# Patient Record
Sex: FEMALE | Race: WHITE | NOT HISPANIC OR LATINO | Employment: FULL TIME | URBAN - METROPOLITAN AREA
[De-identification: names, ages, dates, MRNs, and addresses within clinical notes are randomized per-mention and may not be internally consistent; named-entity substitution may affect disease eponyms.]

---

## 2018-05-23 ENCOUNTER — HOSPITAL ENCOUNTER (OUTPATIENT)
Dept: GENERAL RADIOLOGY | Facility: HOSPITAL | Age: 33
Discharge: HOME | End: 2018-05-23

## 2020-01-29 ENCOUNTER — LAB (OUTPATIENT)
Dept: FAMILY MEDICINE CLINIC | Facility: CLINIC | Age: 35
End: 2020-01-29

## 2020-01-29 ENCOUNTER — OFFICE VISIT (OUTPATIENT)
Dept: FAMILY MEDICINE CLINIC | Facility: CLINIC | Age: 35
End: 2020-01-29

## 2020-01-29 VITALS
TEMPERATURE: 98.1 F | WEIGHT: 140 LBS | SYSTOLIC BLOOD PRESSURE: 112 MMHG | HEART RATE: 82 BPM | DIASTOLIC BLOOD PRESSURE: 78 MMHG | OXYGEN SATURATION: 99 %

## 2020-01-29 DIAGNOSIS — R07.9 CHEST PAIN, UNSPECIFIED TYPE: Primary | ICD-10-CM

## 2020-01-29 DIAGNOSIS — R07.9 CHEST PAIN, UNSPECIFIED TYPE: ICD-10-CM

## 2020-01-29 DIAGNOSIS — R00.2 PALPITATIONS: ICD-10-CM

## 2020-01-29 DIAGNOSIS — R06.02 SHORTNESS OF BREATH: ICD-10-CM

## 2020-01-29 LAB
ALBUMIN SERPL-MCNC: 4.2 G/DL (ref 3.5–5.2)
ALBUMIN/GLOB SERPL: 1.6 G/DL
ALP SERPL-CCNC: 60 U/L (ref 39–117)
ALT SERPL W P-5'-P-CCNC: 6 U/L (ref 1–33)
ANION GAP SERPL CALCULATED.3IONS-SCNC: 12.2 MMOL/L (ref 5–15)
AST SERPL-CCNC: 11 U/L (ref 1–32)
BASOPHILS # BLD AUTO: 0.05 10*3/MM3 (ref 0–0.2)
BASOPHILS NFR BLD AUTO: 0.6 % (ref 0–1.5)
BILIRUB SERPL-MCNC: <0.2 MG/DL (ref 0.2–1.2)
BUN BLD-MCNC: 12 MG/DL (ref 6–20)
BUN/CREAT SERPL: 19.7 (ref 7–25)
CALCIUM SPEC-SCNC: 9.1 MG/DL (ref 8.6–10.5)
CHLORIDE SERPL-SCNC: 102 MMOL/L (ref 98–107)
CO2 SERPL-SCNC: 24.8 MMOL/L (ref 22–29)
CREAT BLD-MCNC: 0.61 MG/DL (ref 0.57–1)
DEPRECATED RDW RBC AUTO: 40.6 FL (ref 37–54)
EOSINOPHIL # BLD AUTO: 0.22 10*3/MM3 (ref 0–0.4)
EOSINOPHIL NFR BLD AUTO: 2.5 % (ref 0.3–6.2)
ERYTHROCYTE [DISTWIDTH] IN BLOOD BY AUTOMATED COUNT: 12 % (ref 12.3–15.4)
GFR SERPL CREATININE-BSD FRML MDRD: 111 ML/MIN/1.73
GLOBULIN UR ELPH-MCNC: 2.6 GM/DL
GLUCOSE BLD-MCNC: 88 MG/DL (ref 65–99)
HCT VFR BLD AUTO: 40.4 % (ref 34–46.6)
HGB BLD-MCNC: 13.6 G/DL (ref 12–15.9)
IMM GRANULOCYTES # BLD AUTO: 0.05 10*3/MM3 (ref 0–0.05)
IMM GRANULOCYTES NFR BLD AUTO: 0.6 % (ref 0–0.5)
LYMPHOCYTES # BLD AUTO: 2.1 10*3/MM3 (ref 0.7–3.1)
LYMPHOCYTES NFR BLD AUTO: 23.6 % (ref 19.6–45.3)
MCH RBC QN AUTO: 30.9 PG (ref 26.6–33)
MCHC RBC AUTO-ENTMCNC: 33.7 G/DL (ref 31.5–35.7)
MCV RBC AUTO: 91.8 FL (ref 79–97)
MONOCYTES # BLD AUTO: 0.71 10*3/MM3 (ref 0.1–0.9)
MONOCYTES NFR BLD AUTO: 8 % (ref 5–12)
NEUTROPHILS # BLD AUTO: 5.75 10*3/MM3 (ref 1.7–7)
NEUTROPHILS NFR BLD AUTO: 64.7 % (ref 42.7–76)
NRBC BLD AUTO-RTO: 0 /100 WBC (ref 0–0.2)
PLATELET # BLD AUTO: 198 10*3/MM3 (ref 140–450)
PMV BLD AUTO: 10.7 FL (ref 6–12)
POTASSIUM BLD-SCNC: 4.2 MMOL/L (ref 3.5–5.2)
PROT SERPL-MCNC: 6.8 G/DL (ref 6–8.5)
RBC # BLD AUTO: 4.4 10*6/MM3 (ref 3.77–5.28)
SODIUM BLD-SCNC: 139 MMOL/L (ref 136–145)
TSH SERPL DL<=0.05 MIU/L-ACNC: 1.16 UIU/ML (ref 0.27–4.2)
WBC NRBC COR # BLD: 8.88 10*3/MM3 (ref 3.4–10.8)

## 2020-01-29 PROCEDURE — 99203 OFFICE O/P NEW LOW 30 MIN: CPT | Performed by: NURSE PRACTITIONER

## 2020-01-29 PROCEDURE — 85025 COMPLETE CBC W/AUTO DIFF WBC: CPT | Performed by: NURSE PRACTITIONER

## 2020-01-29 PROCEDURE — 80053 COMPREHEN METABOLIC PANEL: CPT | Performed by: NURSE PRACTITIONER

## 2020-01-29 PROCEDURE — 36415 COLL VENOUS BLD VENIPUNCTURE: CPT | Performed by: NURSE PRACTITIONER

## 2020-01-29 PROCEDURE — 84443 ASSAY THYROID STIM HORMONE: CPT | Performed by: NURSE PRACTITIONER

## 2020-01-29 NOTE — PROGRESS NOTES
Subjective   Jeannine Delcid is a 34 y.o. female.       HPI   Pt. new to our office today. Says she hasn't seen a PCP in many years.    Has c/o chest pain/palpitations and SOA.    Symptoms started in Oct. 2019  Has a constant chest tightness/SOA; has random palpitations multiple times throughout the day.  Happening everyday at this point; no symptoms currently.      Doesn't feel it is anxiety related.    Better at rest; happens more now when she is active.   Has more fatigue; feels tired all the time. Occasionally will have back pain with it but not always.  No N/V.  No sweating.   Had trouble with gallbladder in the past but not currently.   No bowel or bladder issues.        The following portions of the patient's history were reviewed and updated as appropriate: allergies, current medications, past family history, past medical history, past social history, past surgical history and problem list.    Review of Systems   Constitutional: Negative for activity change, appetite change, chills, diaphoresis, fatigue, fever, unexpected weight gain and unexpected weight loss.   HENT: Negative for congestion, ear pain, nosebleeds, postnasal drip, rhinorrhea, sinus pressure, sore throat, swollen glands, trouble swallowing and voice change.    Eyes: Negative for blurred vision, double vision, photophobia, pain, discharge, redness, itching and visual disturbance.   Respiratory: Positive for shortness of breath. Negative for cough and wheezing.    Cardiovascular: Positive for chest pain and palpitations. Negative for leg swelling.   Gastrointestinal: Negative for abdominal distention, abdominal pain, blood in stool, constipation, diarrhea, nausea, vomiting and indigestion.   Endocrine: Negative for cold intolerance, heat intolerance, polydipsia, polyphagia and polyuria.   Genitourinary: Negative for decreased urine volume, dysuria, flank pain, frequency, hematuria, pelvic pain and urgency.   Musculoskeletal: Positive for back  pain. Negative for arthralgias, gait problem, joint swelling and myalgias.   Skin: Negative for color change, rash and skin lesions.   Allergic/Immunologic: Negative for environmental allergies and food allergies.   Neurological: Negative for dizziness, syncope, weakness, light-headedness, numbness, headache and confusion.   Hematological: Negative for adenopathy.   Psychiatric/Behavioral: Negative for depressed mood. The patient is not nervous/anxious.        Objective   Physical Exam   Constitutional: She is oriented to person, place, and time. She appears well-developed and well-nourished. No distress.   HENT:   Head: Normocephalic and atraumatic.   Right Ear: Hearing, tympanic membrane, external ear and ear canal normal.   Left Ear: Hearing, tympanic membrane, external ear and ear canal normal.   Nose: Nose normal. Right sinus exhibits no maxillary sinus tenderness and no frontal sinus tenderness. Left sinus exhibits no maxillary sinus tenderness and no frontal sinus tenderness.   Mouth/Throat: Uvula is midline, oropharynx is clear and moist and mucous membranes are normal.   Eyes: Pupils are equal, round, and reactive to light. Conjunctivae and EOM are normal. Right eye exhibits no discharge. Left eye exhibits no discharge.   Neck: Normal range of motion. Neck supple. No JVD present. No thyromegaly present.   Cardiovascular: Normal rate, regular rhythm, normal heart sounds and intact distal pulses. Exam reveals no gallop and no friction rub.   No murmur heard.  Pulmonary/Chest: Effort normal and breath sounds normal. No respiratory distress. She has no wheezes. She exhibits no tenderness.   Abdominal: Soft. Bowel sounds are normal. She exhibits no distension and no mass. There is no tenderness. There is no rebound and no guarding.   Musculoskeletal: Normal range of motion. She exhibits no edema.   Lymphadenopathy:     She has no cervical adenopathy.   Neurological: She is alert and oriented to person, place,  and time. No cranial nerve deficit.   Skin: Skin is warm and dry. Capillary refill takes less than 2 seconds. No rash noted. She is not diaphoretic. No erythema.   Psychiatric: She has a normal mood and affect. Her speech is normal and behavior is normal. Judgment and thought content normal. Her mood appears not anxious. Cognition and memory are normal. She does not exhibit a depressed mood.   Vitals reviewed.        Assessment/Plan   Jeannine was seen today for chest pain and shortness of breath.    Diagnoses and all orders for this visit:    Chest pain, unspecified type  Comments:  Holter monitor; consider cardiology referral   CXR  Lab work today  Reviewd warning S/S and when to go to ER  Orders:  -     CBC Auto Differential  -     Comprehensive Metabolic Panel  -     TSH; Future  -     XR Chest PA & Lateral; Future  -     Holter monitor - 24 hour; Future    Palpitations  Comments:  Holter monitor; considder cardiology referral   CXR  Lab work today  Reviewd warning S/S and when to go to ER  Orders:  -     CBC Auto Differential  -     Comprehensive Metabolic Panel  -     TSH; Future  -     XR Chest PA & Lateral; Future  -     Holter monitor - 24 hour; Future    Shortness of breath  Comments:  CXR and labwork today  Orders:  -     CBC Auto Differential  -     Comprehensive Metabolic Panel  -     TSH; Future  -     XR Chest PA & Lateral; Future

## 2020-02-01 ENCOUNTER — HOSPITAL ENCOUNTER (OUTPATIENT)
Dept: GENERAL RADIOLOGY | Facility: HOSPITAL | Age: 35
Discharge: HOME | End: 2020-02-01
Admitting: NURSE PRACTITIONER

## 2020-02-01 DIAGNOSIS — R00.2 PALPITATIONS: ICD-10-CM

## 2020-02-01 DIAGNOSIS — R07.9 CHEST PAIN, UNSPECIFIED TYPE: ICD-10-CM

## 2020-02-01 DIAGNOSIS — R06.02 SHORTNESS OF BREATH: ICD-10-CM

## 2020-02-01 PROCEDURE — 71046 X-RAY EXAM CHEST 2 VIEWS: CPT

## 2020-02-04 ENCOUNTER — APPOINTMENT (OUTPATIENT)
Dept: RESPIRATORY THERAPY | Facility: HOSPITAL | Age: 35
End: 2020-02-04

## 2020-02-06 ENCOUNTER — APPOINTMENT (OUTPATIENT)
Dept: RESPIRATORY THERAPY | Facility: HOSPITAL | Age: 35
End: 2020-02-06

## 2020-02-07 NOTE — PROGRESS NOTES
Patient notified. She wanted to let you know that she canceled the heart monitor because it was too expensive

## 2020-02-07 NOTE — PROGRESS NOTES
She does not want one as of now. She says she is pretty sure it is just anxiety but will call if she changes her mind.

## 2020-07-28 ENCOUNTER — OFFICE VISIT (OUTPATIENT)
Dept: FAMILY MEDICINE CLINIC | Facility: CLINIC | Age: 35
End: 2020-07-28

## 2020-07-28 VITALS
DIASTOLIC BLOOD PRESSURE: 69 MMHG | SYSTOLIC BLOOD PRESSURE: 102 MMHG | OXYGEN SATURATION: 98 % | BODY MASS INDEX: 21.37 KG/M2 | HEIGHT: 68 IN | HEART RATE: 90 BPM | WEIGHT: 141 LBS | TEMPERATURE: 97.5 F

## 2020-07-28 DIAGNOSIS — R59.1 LYMPHADENOPATHY: ICD-10-CM

## 2020-07-28 DIAGNOSIS — J02.9 ACUTE PHARYNGITIS, UNSPECIFIED ETIOLOGY: Primary | ICD-10-CM

## 2020-07-28 PROCEDURE — 99213 OFFICE O/P EST LOW 20 MIN: CPT | Performed by: NURSE PRACTITIONER

## 2020-07-28 RX ORDER — AZITHROMYCIN 250 MG/1
TABLET, FILM COATED ORAL
Qty: 6 TABLET | Refills: 0 | Status: SHIPPED | OUTPATIENT
Start: 2020-07-28 | End: 2022-08-26

## 2020-07-28 NOTE — PATIENT INSTRUCTIONS
Sore Throat  A sore throat is pain, burning, irritation, or scratchiness in the throat. When you have a sore throat, you may feel pain or tenderness in your throat when you swallow or talk.  Many things can cause a sore throat, including:  · An infection.  · Seasonal allergies.  · Dryness in the air.  · Irritants, such as smoke or pollution.  · Radiation treatment to the area.  · Gastroesophageal reflux disease (GERD).  · A tumor.  A sore throat is often the first sign of another sickness. It may happen with other symptoms, such as coughing, sneezing, fever, and swollen neck glands. Most sore throats go away without medical treatment.  Follow these instructions at home:         · Take over-the-counter medicines only as told by your health care provider.  ? If your child has a sore throat, do not give your child aspirin because of the association with Reye syndrome.  · Drink enough fluids to keep your urine pale yellow.  · Rest as needed.  · To help with pain, try:  ? Sipping warm liquids, such as broth, herbal tea, or warm water.  ? Eating or drinking cold or frozen liquids, such as frozen ice pops.  ? Gargling with a salt-water mixture 3-4 times a day or as needed. To make a salt-water mixture, completely dissolve ½-1 tsp (3-6 g) of salt in 1 cup (237 mL) of warm water.  ? Sucking on hard candy or throat lozenges.  ? Putting a cool-mist humidifier in your bedroom at night to moisten the air.  ? Sitting in the bathroom with the door closed for 5-10 minutes while you run hot water in the shower.  · Do not use any products that contain nicotine or tobacco, such as cigarettes, e-cigarettes, and chewing tobacco. If you need help quitting, ask your health care provider.  · Wash your hands well and often with soap and water. If soap and water are not available, use hand .  Contact a health care provider if:  · You have a fever for more than 2-3 days.  · You have symptoms that last (are persistent) for more than  2-3 days.  · Your throat does not get better within 7 days.  · You have a fever and your symptoms suddenly get worse.  · Your child who is 3 months to 3 years old has a temperature of 102.2°F (39°C) or higher.  Get help right away if:  · You have difficulty breathing.  · You cannot swallow fluids, soft foods, or your saliva.  · You have increased swelling in your throat or neck.  · You have persistent nausea and vomiting.  Summary  · A sore throat is pain, burning, irritation, or scratchiness in the throat. Many things can cause a sore throat.  · Take over-the-counter medicines only as told by your health care provider. Do not give your child aspirin.  · Drink plenty of fluids, and rest as needed.  · Contact a health care provider if your symptoms worsen or your sore throat does not get better within 7 days.  This information is not intended to replace advice given to you by your health care provider. Make sure you discuss any questions you have with your health care provider.  Document Released: 01/25/2006 Document Revised: 05/20/2019 Document Reviewed: 05/20/2019  [employer] Patient Education © 2020 [employer]

## 2020-07-28 NOTE — PROGRESS NOTES
Subjective   Jeannine Delcid is a 34 y.o. female.       HPI   Pt. is here today with concern of sore throat and possible swollen lymph nodes X 3 days.  She felt the lymph node on the left side of her neck that has been tender.  She denies any congestion; ear pain; cough or fevers.  She denies any changes to appetite; N/V/D.  She does smoke.    She has gotten relief with ibuprofen otc.    Denies any ill contacts.       The following portions of the patient's history were reviewed and updated as appropriate: allergies, current medications, past family history, past medical history, past social history, past surgical history and problem list.    Review of Systems   Constitutional: Negative for activity change, appetite change, chills, diaphoresis, fatigue, fever, unexpected weight gain and unexpected weight loss.   HENT: Positive for sore throat and swollen glands. Negative for congestion, ear discharge, ear pain, mouth sores, nosebleeds, postnasal drip, rhinorrhea, sinus pressure, sneezing and trouble swallowing.    Eyes: Negative for blurred vision, double vision and visual disturbance.   Respiratory: Negative for cough, chest tightness, shortness of breath and wheezing.    Cardiovascular: Negative for chest pain, palpitations and leg swelling.   Gastrointestinal: Negative for abdominal pain, diarrhea, nausea, vomiting, GERD and indigestion.   Genitourinary: Negative for dysuria, flank pain, frequency, hematuria and urgency.   Musculoskeletal: Negative for arthralgias, back pain, neck pain and neck stiffness.   Skin: Negative for rash.   Neurological: Positive for headache. Negative for dizziness, weakness, light-headedness, numbness and confusion.   Hematological: Positive for adenopathy (left neck).   Psychiatric/Behavioral: Negative for depressed mood. The patient is not nervous/anxious.        Objective   Physical Exam   Constitutional: She is oriented to person, place, and time. She appears well-developed and  well-nourished. No distress.   HENT:   Head: Normocephalic and atraumatic.   Right Ear: Hearing, tympanic membrane, external ear and ear canal normal.   Left Ear: Hearing, tympanic membrane, external ear and ear canal normal.   Nose: Nose normal. Right sinus exhibits no maxillary sinus tenderness and no frontal sinus tenderness. Left sinus exhibits no maxillary sinus tenderness and no frontal sinus tenderness.   Mouth/Throat: Uvula is midline and mucous membranes are normal. Posterior oropharyngeal erythema present. No oropharyngeal exudate, posterior oropharyngeal edema or tonsillar abscesses.   Eyes: Pupils are equal, round, and reactive to light. Conjunctivae are normal.   Neck: Normal range of motion. Neck supple. No JVD present. No thyromegaly present.   Cardiovascular: Normal rate, regular rhythm, normal heart sounds and intact distal pulses.   No murmur heard.  Pulmonary/Chest: Effort normal and breath sounds normal. No respiratory distress. She has no wheezes. She exhibits no tenderness.   Abdominal: Soft. Bowel sounds are normal. She exhibits no distension. There is no tenderness.   Musculoskeletal: She exhibits no edema.   Lymphadenopathy:        Head (right side): No submental, no submandibular, no tonsillar, no preauricular, no posterior auricular and no occipital adenopathy present.        Head (left side): No submental, no submandibular, no tonsillar, no preauricular, no posterior auricular and no occipital adenopathy present.     She has cervical adenopathy.        Right cervical: No superficial cervical, no deep cervical and no posterior cervical adenopathy present.       Left cervical: Superficial cervical adenopathy present. No deep cervical and no posterior cervical adenopathy present.     She has no axillary adenopathy.   Pea-sized left superficial cervical lymph node; mild tenderness with palpation.    Neurological: She is alert and oriented to person, place, and time.   Skin: Skin is warm and  dry. Capillary refill takes less than 2 seconds. No rash noted. No erythema.   Psychiatric: She has a normal mood and affect.   Vitals reviewed.        Assessment/Plan   Jeannine was seen today for pain.    Diagnoses and all orders for this visit:    Acute pharyngitis, unspecified etiology  Comments:  Given Zpak.   Warm salt water gargles several times daily; throat spray or lozenges as needed.   Tylenol or Ibuprofen as needed.   Orders:  -     azithromycin (Zithromax Z-Nahun) 250 MG tablet; Take 2 tablets the first day, then 1 tablet daily for 4 days.    Lymphadenopathy  Comments:  Warm compresses several times daily.   Tylenol or Ibuprofen as needed.    Call for worsening symptoms or if not improving.   Orders:  -     azithromycin (Zithromax Z-Nahun) 250 MG tablet; Take 2 tablets the first day, then 1 tablet daily for 4 days.

## 2020-10-08 ENCOUNTER — LAB (OUTPATIENT)
Dept: LAB | Facility: HOSPITAL | Age: 35
End: 2020-10-08

## 2020-10-08 ENCOUNTER — TRANSCRIBE ORDERS (OUTPATIENT)
Dept: ADMINISTRATIVE | Facility: HOSPITAL | Age: 35
End: 2020-10-08

## 2020-10-08 DIAGNOSIS — Z01.818 PRE-OPERATIVE CLEARANCE: ICD-10-CM

## 2020-10-08 DIAGNOSIS — Z01.818 PRE-OPERATIVE CLEARANCE: Primary | ICD-10-CM

## 2020-10-08 LAB
ABO GROUP BLD: NORMAL
BASOPHILS # BLD AUTO: 0.04 10*3/MM3 (ref 0–0.2)
BASOPHILS NFR BLD AUTO: 0.6 % (ref 0–1.5)
BLD GP AB SCN SERPL QL: NEGATIVE
DEPRECATED RDW RBC AUTO: 40.1 FL (ref 37–54)
EOSINOPHIL # BLD AUTO: 0.18 10*3/MM3 (ref 0–0.4)
EOSINOPHIL NFR BLD AUTO: 2.6 % (ref 0.3–6.2)
ERYTHROCYTE [DISTWIDTH] IN BLOOD BY AUTOMATED COUNT: 11.9 % (ref 12.3–15.4)
HCG INTACT+B SERPL-ACNC: <0.5 MIU/ML
HCT VFR BLD AUTO: 41.4 % (ref 34–46.6)
HGB BLD-MCNC: 13.7 G/DL (ref 12–15.9)
IMM GRANULOCYTES # BLD AUTO: 0.03 10*3/MM3 (ref 0–0.05)
IMM GRANULOCYTES NFR BLD AUTO: 0.4 % (ref 0–0.5)
LYMPHOCYTES # BLD AUTO: 1.98 10*3/MM3 (ref 0.7–3.1)
LYMPHOCYTES NFR BLD AUTO: 28.1 % (ref 19.6–45.3)
MCH RBC QN AUTO: 30.5 PG (ref 26.6–33)
MCHC RBC AUTO-ENTMCNC: 33.1 G/DL (ref 31.5–35.7)
MCV RBC AUTO: 92.2 FL (ref 79–97)
MONOCYTES # BLD AUTO: 0.56 10*3/MM3 (ref 0.1–0.9)
MONOCYTES NFR BLD AUTO: 7.9 % (ref 5–12)
NEUTROPHILS NFR BLD AUTO: 4.26 10*3/MM3 (ref 1.7–7)
NEUTROPHILS NFR BLD AUTO: 60.4 % (ref 42.7–76)
NRBC BLD AUTO-RTO: 0 /100 WBC (ref 0–0.2)
PLATELET # BLD AUTO: 191 10*3/MM3 (ref 140–450)
PMV BLD AUTO: 11 FL (ref 6–12)
RBC # BLD AUTO: 4.49 10*6/MM3 (ref 3.77–5.28)
RH BLD: POSITIVE
T&S EXPIRATION DATE: NORMAL
WBC # BLD AUTO: 7.05 10*3/MM3 (ref 3.4–10.8)

## 2020-10-08 PROCEDURE — 85025 COMPLETE CBC W/AUTO DIFF WBC: CPT

## 2020-10-08 PROCEDURE — 86850 RBC ANTIBODY SCREEN: CPT | Performed by: OBSTETRICS & GYNECOLOGY

## 2020-10-08 PROCEDURE — 84702 CHORIONIC GONADOTROPIN TEST: CPT

## 2020-10-08 PROCEDURE — 36415 COLL VENOUS BLD VENIPUNCTURE: CPT

## 2020-10-08 PROCEDURE — 86901 BLOOD TYPING SEROLOGIC RH(D): CPT

## 2020-10-08 PROCEDURE — 86900 BLOOD TYPING SEROLOGIC ABO: CPT | Performed by: OBSTETRICS & GYNECOLOGY

## 2020-10-08 PROCEDURE — 86901 BLOOD TYPING SEROLOGIC RH(D): CPT | Performed by: OBSTETRICS & GYNECOLOGY

## 2020-10-08 PROCEDURE — 86900 BLOOD TYPING SEROLOGIC ABO: CPT

## 2021-04-28 ENCOUNTER — OFFICE VISIT (OUTPATIENT)
Dept: FAMILY MEDICINE CLINIC | Facility: CLINIC | Age: 36
End: 2021-04-28

## 2021-04-28 ENCOUNTER — LAB (OUTPATIENT)
Dept: FAMILY MEDICINE CLINIC | Facility: CLINIC | Age: 36
End: 2021-04-28

## 2021-04-28 VITALS
SYSTOLIC BLOOD PRESSURE: 114 MMHG | TEMPERATURE: 97.5 F | HEART RATE: 88 BPM | BODY MASS INDEX: 21.37 KG/M2 | WEIGHT: 141 LBS | OXYGEN SATURATION: 99 % | DIASTOLIC BLOOD PRESSURE: 78 MMHG | HEIGHT: 68 IN

## 2021-04-28 DIAGNOSIS — R19.7 DIARRHEA, UNSPECIFIED TYPE: Primary | ICD-10-CM

## 2021-04-28 DIAGNOSIS — R19.7 DIARRHEA, UNSPECIFIED TYPE: ICD-10-CM

## 2021-04-28 LAB
ALBUMIN SERPL-MCNC: 4.4 G/DL (ref 3.5–5.2)
ALBUMIN/GLOB SERPL: 2 G/DL
ALP SERPL-CCNC: 74 U/L (ref 39–117)
ALT SERPL W P-5'-P-CCNC: 9 U/L (ref 1–33)
ANION GAP SERPL CALCULATED.3IONS-SCNC: 8.6 MMOL/L (ref 5–15)
AST SERPL-CCNC: 14 U/L (ref 1–32)
BASOPHILS # BLD AUTO: 0.05 10*3/MM3 (ref 0–0.2)
BASOPHILS NFR BLD AUTO: 0.7 % (ref 0–1.5)
BILIRUB SERPL-MCNC: 0.2 MG/DL (ref 0–1.2)
BUN SERPL-MCNC: 9 MG/DL (ref 6–20)
BUN/CREAT SERPL: 14.8 (ref 7–25)
CALCIUM SPEC-SCNC: 9.1 MG/DL (ref 8.6–10.5)
CHLORIDE SERPL-SCNC: 104 MMOL/L (ref 98–107)
CO2 SERPL-SCNC: 27.4 MMOL/L (ref 22–29)
CREAT SERPL-MCNC: 0.61 MG/DL (ref 0.57–1)
DEPRECATED RDW RBC AUTO: 40.4 FL (ref 37–54)
EOSINOPHIL # BLD AUTO: 0.21 10*3/MM3 (ref 0–0.4)
EOSINOPHIL NFR BLD AUTO: 3 % (ref 0.3–6.2)
ERYTHROCYTE [DISTWIDTH] IN BLOOD BY AUTOMATED COUNT: 12 % (ref 12.3–15.4)
GFR SERPL CREATININE-BSD FRML MDRD: 110 ML/MIN/1.73
GLOBULIN UR ELPH-MCNC: 2.2 GM/DL
GLUCOSE SERPL-MCNC: 86 MG/DL (ref 65–99)
HCT VFR BLD AUTO: 41.4 % (ref 34–46.6)
HGB BLD-MCNC: 13.8 G/DL (ref 12–15.9)
IMM GRANULOCYTES # BLD AUTO: 0.02 10*3/MM3 (ref 0–0.05)
IMM GRANULOCYTES NFR BLD AUTO: 0.3 % (ref 0–0.5)
LYMPHOCYTES # BLD AUTO: 1.96 10*3/MM3 (ref 0.7–3.1)
LYMPHOCYTES NFR BLD AUTO: 28.3 % (ref 19.6–45.3)
MCH RBC QN AUTO: 30.5 PG (ref 26.6–33)
MCHC RBC AUTO-ENTMCNC: 33.3 G/DL (ref 31.5–35.7)
MCV RBC AUTO: 91.4 FL (ref 79–97)
MONOCYTES # BLD AUTO: 0.61 10*3/MM3 (ref 0.1–0.9)
MONOCYTES NFR BLD AUTO: 8.8 % (ref 5–12)
NEUTROPHILS NFR BLD AUTO: 4.07 10*3/MM3 (ref 1.7–7)
NEUTROPHILS NFR BLD AUTO: 58.9 % (ref 42.7–76)
NRBC BLD AUTO-RTO: 0 /100 WBC (ref 0–0.2)
PLATELET # BLD AUTO: 206 10*3/MM3 (ref 140–450)
PMV BLD AUTO: 10.8 FL (ref 6–12)
POTASSIUM SERPL-SCNC: 4.3 MMOL/L (ref 3.5–5.2)
PROT SERPL-MCNC: 6.6 G/DL (ref 6–8.5)
RBC # BLD AUTO: 4.53 10*6/MM3 (ref 3.77–5.28)
SODIUM SERPL-SCNC: 140 MMOL/L (ref 136–145)
WBC # BLD AUTO: 6.92 10*3/MM3 (ref 3.4–10.8)

## 2021-04-28 PROCEDURE — 36415 COLL VENOUS BLD VENIPUNCTURE: CPT

## 2021-04-28 PROCEDURE — 85025 COMPLETE CBC W/AUTO DIFF WBC: CPT | Performed by: NURSE PRACTITIONER

## 2021-04-28 PROCEDURE — 80053 COMPREHEN METABOLIC PANEL: CPT | Performed by: NURSE PRACTITIONER

## 2021-04-28 PROCEDURE — 99213 OFFICE O/P EST LOW 20 MIN: CPT | Performed by: NURSE PRACTITIONER

## 2021-04-28 RX ORDER — OMEPRAZOLE 40 MG/1
40 CAPSULE, DELAYED RELEASE ORAL DAILY
Qty: 30 CAPSULE | Refills: 0 | Status: SHIPPED | OUTPATIENT
Start: 2021-04-28

## 2021-04-28 RX ORDER — DICYCLOMINE HYDROCHLORIDE 10 MG/1
10 CAPSULE ORAL
Qty: 30 CAPSULE | Refills: 0 | Status: SHIPPED | OUTPATIENT
Start: 2021-04-28

## 2021-04-28 RX ORDER — PANTOPRAZOLE SODIUM 40 MG/1
40 TABLET, DELAYED RELEASE ORAL DAILY
Qty: 30 TABLET | Refills: 0 | Status: CANCELLED | OUTPATIENT
Start: 2021-04-28

## 2021-04-28 NOTE — PATIENT INSTRUCTIONS
Start omeprazole daily  Take bentyl as needed for abdominal cramping  immodium as needed  Referral to GI  Complete blood work

## 2021-04-28 NOTE — PROGRESS NOTES
"Subjective   Jeannine Delcid is a 35 y.o. female.     Pt is here today with c/o diarrhea, abdominal pain, vomiting.  She states that the diarrhea is chronic and has been occurring for the last year.  The diarrhea has been worsening over the last month.  She is having 5-7 episodes a night, only occasionally during the day.  Last night she was up all night with the diarrhea.  She gets lower abdominal pain with it.  She doesn't know any triggers.  She has been taking pepto bisthmuth which helps some.  She has some bloating.  Probiotics have helped with the bloating.  Has a hemorrhoid and occasional has bright red blood.   She reports acid reflux.  She smokes  Does not drink alcohol           The following portions of the patient's history were reviewed and updated as appropriate: allergies, current medications, past family history, past medical history, past social history, past surgical history and problem list.    Review of Systems   Constitutional: Negative for chills, fatigue and fever.   Respiratory: Negative for chest tightness and shortness of breath.    Cardiovascular: Negative for chest pain and palpitations.   Gastrointestinal: Positive for abdominal pain, diarrhea, nausea and vomiting.   Neurological: Negative for dizziness and headache.       Objective   /78 (BP Location: Right arm, Patient Position: Sitting, Cuff Size: Adult)   Pulse 88   Temp 97.5 °F (36.4 °C) (Temporal)   Ht 172.7 cm (68\")   Wt 64 kg (141 lb)   SpO2 99%   BMI 21.44 kg/m²   Physical Exam  Constitutional:       Appearance: Normal appearance. She is not ill-appearing.   HENT:      Head: Normocephalic and atraumatic.   Cardiovascular:      Rate and Rhythm: Normal rate and regular rhythm.      Heart sounds: No murmur heard.     Pulmonary:      Effort: Pulmonary effort is normal. No respiratory distress.      Breath sounds: Normal breath sounds.   Abdominal:      General: Abdomen is flat.      Palpations: Abdomen is soft.      " Tenderness: There is abdominal tenderness (lower abdomen).   Skin:     General: Skin is warm and dry.   Neurological:      General: No focal deficit present.      Mental Status: She is alert and oriented to person, place, and time.   Psychiatric:         Mood and Affect: Mood normal.         Behavior: Behavior normal.         Thought Content: Thought content normal.         Judgment: Judgment normal.           Assessment/Plan     Diagnoses and all orders for this visit:    1. Diarrhea, unspecified type (Primary)  Comments:  possibly IBS  check labs- possibly gallbladder  start omeprazole for gERD  bentyl for abd pain- ibs  referral to GI    Orders:  -     Ambulatory Referral to Gastroenterology  -     Comprehensive Metabolic Panel; Future  -     CBC & Differential  -     dicyclomine (Bentyl) 10 MG capsule; Take 1 capsule by mouth 4 (Four) Times a Day Before Meals & at Bedtime As Needed (abdominal cramping).  Dispense: 30 capsule; Refill: 0  -     omeprazole (priLOSEC) 40 MG capsule; Take 1 capsule by mouth Daily.  Dispense: 30 capsule; Refill: 0    Other orders  -     Cancel: pantoprazole (Protonix) 40 MG EC tablet; Take 1 tablet by mouth Daily.  Dispense: 30 tablet; Refill: 0

## 2022-08-25 NOTE — PROGRESS NOTES
Subjective     Jeannine Delcid is a 36 y.o. female.     Patient is here today for [facility] ER follow-up.  Patient went to the hospital on July 27 with complaints of right leg pain.  She reported is progressive and was affecting the calf and the thigh.  Denied any trauma.  Patient had a D-dimer completed and a CK which both came back normal.  They believed that she was likely having muscle pain due to excessive physical exercise during that time.  Pt states that she walks often.  She had originally went to a [employer] park and had immediate pain when she first got there.   Pt does smoke.  She states she had swelling in the calf.  The pain moved to her knee and top of her hip.  She still has some pain in the back of her calf.  She still walks about 3 miles a day and sometimes after that it gets tight.  Denies numbness or tingling.  Denies history of blood clots  She is not on birth control.   She will sometimes take NSAIDs in the evening.         The following portions of the patient's history were reviewed and updated as appropriate: allergies, current medications, past family history, past medical history, past social history, past surgical history and problem list.    Review of Systems   Constitutional: Negative for fatigue and fever.   Respiratory: Negative for chest tightness and shortness of breath.    Cardiovascular: Negative for chest pain and palpitations.   Musculoskeletal: Positive for myalgias.   Neurological: Negative for dizziness and headache.       Objective     /70 (BP Location: Left arm, Patient Position: Sitting, Cuff Size: Adult)   Pulse 81   Temp 98.6 °F (37 °C) (Tympanic)   Wt 64 kg (141 lb)   SpO2 100%   BMI 21.44 kg/m²     Current Outpatient Medications on File Prior to Visit   Medication Sig Dispense Refill   • bismuth subsalicylate (PEPTO BISMOL) 262 MG/15ML suspension Take 15 mL by mouth Every 6 (Six) Hours As Needed for Indigestion.     • dicyclomine (Bentyl) 10 MG capsule  Take 1 capsule by mouth 4 (Four) Times a Day Before Meals & at Bedtime As Needed (abdominal cramping). 30 capsule 0   • omeprazole (priLOSEC) 40 MG capsule Take 1 capsule by mouth Daily. 30 capsule 0   • [DISCONTINUED] azithromycin (Zithromax Z-Nahun) 250 MG tablet Take 2 tablets the first day, then 1 tablet daily for 4 days. 6 tablet 0     No current facility-administered medications on file prior to visit.        Physical Exam  Vitals reviewed.   Constitutional:       General: She is not in acute distress.     Appearance: Normal appearance. She is well-developed. She is not diaphoretic.   HENT:      Head: Normocephalic and atraumatic.   Eyes:      General:         Right eye: No discharge.         Left eye: No discharge.      Extraocular Movements: Extraocular movements intact.      Conjunctiva/sclera: Conjunctivae normal.   Cardiovascular:      Rate and Rhythm: Normal rate and regular rhythm.      Heart sounds: No murmur heard.  Pulmonary:      Effort: Pulmonary effort is normal. No respiratory distress.      Breath sounds: Normal breath sounds. No wheezing or rales.   Abdominal:      General: Bowel sounds are normal.      Palpations: Abdomen is soft.   Musculoskeletal:         General: Normal range of motion.      Cervical back: Normal range of motion.      Comments: Negative Holmans right leg   Skin:     General: Skin is warm and dry.   Neurological:      General: No focal deficit present.      Mental Status: She is alert and oriented to person, place, and time.   Psychiatric:         Mood and Affect: Mood normal.         Behavior: Behavior normal.         Thought Content: Thought content normal.         Judgment: Judgment normal.           Assessment & Plan     Diagnoses and all orders for this visit:    1. Right calf pain (Primary)  Comments:  Appears to be a muscle strain  Previous D-dimer was normal  Negative Homans  Referral to PT and sports med  Ibuprofen as needed  Stretch nightly  Orders:  -     Ambulatory  Referral to Orthopedic Surgery  -     Ambulatory Referral to Physical Therapy Evaluate and treat

## 2022-08-26 ENCOUNTER — OFFICE VISIT (OUTPATIENT)
Dept: FAMILY MEDICINE CLINIC | Facility: CLINIC | Age: 37
End: 2022-08-26

## 2022-08-26 VITALS
SYSTOLIC BLOOD PRESSURE: 104 MMHG | BODY MASS INDEX: 21.44 KG/M2 | TEMPERATURE: 98.6 F | DIASTOLIC BLOOD PRESSURE: 70 MMHG | WEIGHT: 141 LBS | HEART RATE: 81 BPM | OXYGEN SATURATION: 100 %

## 2022-08-26 DIAGNOSIS — M79.661 RIGHT CALF PAIN: Primary | ICD-10-CM

## 2022-08-26 PROCEDURE — 99213 OFFICE O/P EST LOW 20 MIN: CPT | Performed by: NURSE PRACTITIONER

## 2022-09-16 ENCOUNTER — HOSPITAL ENCOUNTER (OUTPATIENT)
Dept: CARDIOLOGY | Facility: HOSPITAL | Age: 37
Discharge: HOME | End: 2022-09-16
Admitting: FAMILY MEDICINE

## 2022-09-16 ENCOUNTER — OFFICE VISIT (OUTPATIENT)
Dept: ORTHOPEDIC SURGERY | Facility: CLINIC | Age: 37
End: 2022-09-16

## 2022-09-16 VITALS — BODY MASS INDEX: 21.37 KG/M2 | HEIGHT: 68 IN | HEART RATE: 89 BPM | OXYGEN SATURATION: 99 % | WEIGHT: 141 LBS

## 2022-09-16 DIAGNOSIS — M79.661 RIGHT CALF PAIN: Primary | ICD-10-CM

## 2022-09-16 DIAGNOSIS — M79.661 RIGHT CALF PAIN: ICD-10-CM

## 2022-09-16 LAB
BH CV LOWER VASCULAR LEFT COMMON FEMORAL AUGMENT: NORMAL
BH CV LOWER VASCULAR LEFT COMMON FEMORAL COMPETENT: NORMAL
BH CV LOWER VASCULAR LEFT COMMON FEMORAL COMPRESS: NORMAL
BH CV LOWER VASCULAR LEFT COMMON FEMORAL PHASIC: NORMAL
BH CV LOWER VASCULAR LEFT COMMON FEMORAL SPONT: NORMAL
BH CV LOWER VASCULAR RIGHT COMMON FEMORAL AUGMENT: NORMAL
BH CV LOWER VASCULAR RIGHT COMMON FEMORAL COMPETENT: NORMAL
BH CV LOWER VASCULAR RIGHT COMMON FEMORAL COMPRESS: NORMAL
BH CV LOWER VASCULAR RIGHT COMMON FEMORAL PHASIC: NORMAL
BH CV LOWER VASCULAR RIGHT COMMON FEMORAL SPONT: NORMAL
BH CV LOWER VASCULAR RIGHT DISTAL FEMORAL COMPRESS: NORMAL
BH CV LOWER VASCULAR RIGHT GASTRONEMIUS COMPRESS: NORMAL
BH CV LOWER VASCULAR RIGHT GREATER SAPH AK COMPRESS: NORMAL
BH CV LOWER VASCULAR RIGHT GREATER SAPH BK COMPRESS: NORMAL
BH CV LOWER VASCULAR RIGHT LESSER SAPH COMPRESS: NORMAL
BH CV LOWER VASCULAR RIGHT MID FEMORAL AUGMENT: NORMAL
BH CV LOWER VASCULAR RIGHT MID FEMORAL COMPETENT: NORMAL
BH CV LOWER VASCULAR RIGHT MID FEMORAL COMPRESS: NORMAL
BH CV LOWER VASCULAR RIGHT MID FEMORAL PHASIC: NORMAL
BH CV LOWER VASCULAR RIGHT MID FEMORAL SPONT: NORMAL
BH CV LOWER VASCULAR RIGHT PERONEAL COMPRESS: NORMAL
BH CV LOWER VASCULAR RIGHT POPLITEAL AUGMENT: NORMAL
BH CV LOWER VASCULAR RIGHT POPLITEAL COMPETENT: NORMAL
BH CV LOWER VASCULAR RIGHT POPLITEAL COMPRESS: NORMAL
BH CV LOWER VASCULAR RIGHT POPLITEAL PHASIC: NORMAL
BH CV LOWER VASCULAR RIGHT POPLITEAL SPONT: NORMAL
BH CV LOWER VASCULAR RIGHT POSTERIOR TIBIAL COMPRESS: NORMAL
BH CV LOWER VASCULAR RIGHT PROXIMAL FEMORAL COMPRESS: NORMAL
BH CV LOWER VASCULAR RIGHT SAPHENOFEMORAL JUNCTION COMPRESS: NORMAL
MAXIMAL PREDICTED HEART RATE: 182 BPM
STRESS TARGET HR: 155 BPM

## 2022-09-16 PROCEDURE — 93971 EXTREMITY STUDY: CPT

## 2022-09-16 PROCEDURE — 99203 OFFICE O/P NEW LOW 30 MIN: CPT | Performed by: FAMILY MEDICINE

## 2022-09-19 NOTE — PROGRESS NOTES
"Primary Care Sports Medicine Office Visit Note     Patient ID: Jeannine Delcid is a 36 y.o. female.    Chief Complaint:  Chief Complaint   Patient presents with   • Right Lower Leg - Pain     HPI:    Ms. Jeannine Delcid is a 36 y.o. female. The patient presents to the clinic today for right calf pain.    The patient reports that she began experiencing right calf pain after a long car ride approximately on the 3rd week of July. She sat still for 4 hours during the car ride. When she got out of the car, the pain was immediate. Within 3 hours, she was barely able to ambulate with her right leg. The patient then went to the emergency room in [place] and had a D-dimer test performed to see if she had any blood clots. She has sent a picture of her right calf to her mother for an opinion. The mother advised the patient to go to the emergency room. She denies taking birth control. The patient smokes approximately 10 cigarettes daily. She states that she cannot be on the patch for 24 hours at a time because it would cause abdominal pain. Her grandmother on her maternal side possibly has a bleeding or clotting disorder. On her paternal side, she believes someone had heart disease. She does not wear compression socks. The patient arrived at a theme park; however, no one was there, so she and the guests planned on ambulating for approximately 1 hour. She was not in the theme park for 15 minutes and states that she cannot stand. The patient reports that her primary care physician mentioned a baker's cyst. She denies experiencing any knee pain in the past. She states that the whole posterior aspect of the leg was \"sharp and hot\".    The patient states that her mother is a nurse.    Past Medical History:   Diagnosis Date   • Acute UTI    • Allergic    • Bacterial vaginitis    • Condition not found     CONDITION THAT CAUSES CYST GROWTH ON VARIOUS PARTS OF BODY PER PT.   • Pharyngitis, acute    • Vaginal discharge    • Visual " "impairment        Past Surgical History:   Procedure Laterality Date   • COSMETIC SURGERY      ON FACE TO REMOVE FACE   • LAPAROSCOPIC OVARIAN CYSTECTOMY         Family History   Problem Relation Age of Onset   • Anxiety disorder Mother    • Depression Mother    • Hyperlipidemia Mother    • Thyroid disease Mother    • Heart disease Father    • Hypertension Father    • Asthma Sister    • Anxiety disorder Sister    • Mental illness Sister    • Miscarriages / Stillbirths Sister    • Thyroid disease Sister    • Learning disabilities Son    • Anxiety disorder Son    • Alcohol abuse Maternal Grandmother    • Heart disease Maternal Grandmother    • Heart disease Maternal Grandfather      Social History     Occupational History   • Not on file   Tobacco Use   • Smoking status: Former Smoker   • Smokeless tobacco: Never Used   • Tobacco comment: quit 2 months ago and uses patch   Substance and Sexual Activity   • Alcohol use: Yes   • Drug use: Not Currently   • Sexual activity: Yes     Partners: Male      Review of Systems   Constitutional: Negative for activity change and fever.   Musculoskeletal: Positive for arthralgias.   Skin: Negative for color change and rash.   Neurological: Negative for weakness.     Objective:    Pulse 89   Ht 172.7 cm (68\")   Wt 64 kg (141 lb)   SpO2 99%   BMI 21.44 kg/m²     Physical Examination:  Physical Exam  Vitals and nursing note reviewed.   Constitutional:       General: She is not in acute distress.     Appearance: She is well-developed. She is not diaphoretic.   HENT:      Head: Normocephalic and atraumatic.   Eyes:      Conjunctiva/sclera: Conjunctivae normal.   Pulmonary:      Effort: Pulmonary effort is normal. No respiratory distress.   Skin:     General: Skin is warm.      Capillary Refill: Capillary refill takes less than 2 seconds.   Neurological:      Mental Status: She is alert.       Ortho Exam   Right lower extremity exhibits positive Homans' sign with tenderness palpation " to the deep vasculature in the posterior calf.  There is very mild swelling, soft tissue pitting edema to the level of the knee.  No distention of veins.    Imaging and other tests:  No new imaging today.    Assessment and Plan:    1. Right calf pain  - Duplex Venous Lower Extremity - Right CAR; Future    The patient will be sent to the hospital urgently for venous Doppler ultrasound to rule out DVT. Continue with physical therapy sessions.    Transcribed from ambient dictation for Kalen Montanez II, DO by Clark Hernandez.  09/19/22   09:52 EDT    Patient verbalized consent to the visit recording.    Disclaimer: Please note that areas of this note were completed with computer voice recognition software.  Quite often unanticipated grammatical, syntax, homophones, and other interpretive errors are inadvertently transcribed by the computer software. Please excuse any errors that have escaped final proofreading.

## 2022-09-21 ENCOUNTER — TREATMENT (OUTPATIENT)
Dept: PHYSICAL THERAPY | Facility: CLINIC | Age: 37
End: 2022-09-21

## 2022-09-21 DIAGNOSIS — M79.604 RIGHT LEG PAIN: ICD-10-CM

## 2022-09-21 DIAGNOSIS — M79.661 RIGHT CALF PAIN: Primary | ICD-10-CM

## 2022-09-21 DIAGNOSIS — M66.0 BAKER'S CYST, RUPTURED: ICD-10-CM

## 2022-09-21 PROCEDURE — 97140 MANUAL THERAPY 1/> REGIONS: CPT | Performed by: PHYSICAL THERAPIST

## 2022-09-21 PROCEDURE — 97162 PT EVAL MOD COMPLEX 30 MIN: CPT | Performed by: PHYSICAL THERAPIST

## 2022-09-21 NOTE — PROGRESS NOTES
Physical Therapy Initial Evaluation and Plan of Care    Patient: Jeannine Delcid   : 1985  Diagnosis/ICD-10 Code:  Right calf pain [M79.661]  Referring practitioner: EDISON Quiroga  Date of Initial Visit: 2022  Today's Date: 2022  Patient seen for 1 sessions           Subjective Questionnaire: LEFS: 60/80 = 75% impairment      Subjective Evaluation    History of Present Illness  Mechanism of injury: The patient reports that she began experiencing right calf pain after a long car ride approximately on the  week of July. She sat still for 4 hours during the car ride. When she got out of the car, the pain was immediate. Within 3 hours, she was barely able to ambulate with her right leg. The patient then went to the emergency room in [place] and had a D-dimer test performed to see if she had any blood clots. The pain was originally at back of calf just below knee and tightness in R hip. She also noted swelling in R foot and calf. She had a doppler on Friday which came back negative for clots.   Since the original onset of pain, she is in extreme pain from her R hip to her foot. When she bends she hears sounds at the knee. Current 20 min activity tolerance. The calf soreness has not gone away. She has a cruise coming up in March and wants to be able to walk and go rock climbing.   PSH: ovarian cyst removal; PMH: no significant        Patient Occupation: , lots of walking Pain  Current pain rating: 3  At best pain ratin  Location: R proximal calf  Quality: discomfort, burning and dull ache    Social Support  Lives with: young children    Treatments  Current treatment: physical therapy  Patient Goals  Patient goals for therapy: decreased pain, return to sport/leisure activities and independence with ADLs/IADLs             Objective          Palpation     Right   Hypertonic in the lumbar paraspinals. Tenderness of the gluteus foster, gluteus medius, medial  gastrocnemius, piriformis and TFL.     Tenderness     Right Hip   Tenderness in the greater trochanter.     Right Knee   Tenderness in the popliteal fossa. No tenderness in the fibular head, inferior fat pad, inferior patella, ITB, lateral joint line, lateral patella, lateral retinaculum, LCL (distal), LCL (proximal), MCL (distal), MCL (proximal), medial joint line, medial patella, patellar tendon, quadriceps tendon and superior patella.     Active Range of Motion   Left Hip   Normal active range of motion  External rotation (90/90): 80 degrees   Internal rotation (90/90): WFL    Right Hip   External rotation (90/90): 60 degrees   Internal rotation (90/90): with pain    Strength/Myotome Testing     Left Hip   Normal muscle strength    Right Hip   Planes of Motion   Flexion: 4  Abduction: 3+    Left Knee   Flexion: 5  Extension: 5    Right Knee   Flexion: 4  Extension: 4    Tests     Right Knee   Negative anterior drawer, anterior Lachman, Apley's compression, bounce home, patella-femoral grind, pivot shift, posterior drawer, Thessaly's test at 5 degrees, valgus stress test at 0 degrees and varus stress test at 0 degrees.       SCT: Pt was provided with a hard copy of the HEP and instructed in use of it and all listed exercises.  Pt was instructed to cease any exercise that was painful, or that feels as though the form is incorrect.  Pt will return with any questions or difficulty at next session.  Pt acknowledged these instructions and agreed. (NORMA stretch, tennis ball to calf/popliteus)    Assessment & Plan     Assessment  Impairments: activity intolerance and impaired physical strength  Functional Limitations: sleeping, walking, uncomfortable because of pain, moving in bed, sitting, standing, stooping and unable to perform repetitive tasks  Assessment details: Jeannine is a 35 yo female presenting to OP  PT w/acute R popliteal & proximal midline calf pain & tenderness. Testing has ruled out DVT. Onset ~mid June  after 4 hr car ride. She also has limited R hip ER ROM compared to L. R OI spasm was noted as well which PT released in clinic today. S/s are consistent w/baker's cyst rupture.   The patient is an appropriate candidate for physical therapy and will benefit from skilled patient intervention in order address the above impairments/limitations.  The plan of care, goals and treatment plan were discussed with the patient and the patient is agreeable to participating in patient services.    Goals  Plan Goals: STG to be met in 4 wks  1. Pt will be safe, independent, and compliant with HEP to optimize recovery and self management of symptoms.  2. Pt will demonstrate at least 70 deg ER at R hip in 90/90 for balanced joint mechanics and reduced aberrant forces during ambulation.  3. Pt will demonstrate absence of R popliteal tenderness to reflect tissue healing.     LTG to be met in 12 wks  1. Pt will improve LEFS from 75% impairment to no greater than 45% impairment.  2. Pt will demonstrate at least 70 deg ER at R hip in 90/90 for balanced joint mechanics and reduced aberrant forces during ambulation.  3. Pt will demonstrate absence of R popliteal tenderness to reflect tissue healing.  4. Pt will improve RLE strength grossly to at least 4+/5 to reduce abnormal joint & soft tissue stresses.     Plan  Therapy options: will be seen for skilled therapy services  Planned modality interventions: cryotherapy, dry needling, high voltage pulsed current (pain management), TENS, thermotherapy (hydrocollator packs) and ultrasound  Planned therapy interventions: ADL retraining, body mechanics training, flexibility, functional ROM exercises, gait training, home exercise program, IADL retraining, joint mobilization, motor coordination training, neuromuscular re-education, postural training, soft tissue mobilization, spinal/joint mobilization, strengthening, stretching and therapeutic activities  Frequency: 1x week  Duration in weeks:  12  Treatment plan discussed with: patient        Timed:         Manual Therapy:    10     mins  63301;     Therapeutic Exercise:         mins  40203;     Neuromuscular Leona:        mins  95412;    Therapeutic Activity:          mins  13945;     Gait Training:           mins  62303;     Ultrasound:          mins  20589;    Self-care  __5__ mins 49444    Un-Timed:  Electrical Stimulation:         mins  69551 ( );  Dry Needling          mins self-pay 09146  Traction          mins 67562  Low Eval          mins  16830  Mod Eval     30     mins  99721  High Eval                            mins  45039  Canal repositioning ___  mins  48708        Timed Treatment:   15   mins   Total Treatment:     45   mins    PT SIGNATURE: Leana Valerio PT, DPT, cert. DN  [place] license # [id number]  Electronically signed by Leana Valerio PT, 09/21/22, 12:59 PM EDT    Initial Certification  Certification Period: 9/21/2022 through 12/19/2022  I certify that the therapy services are furnished while this patient is under my care.  The services outlined above are required by this patient, and will be reviewed every 90 days.     PHYSICIAN: Keren Linn APRN    NPI: [id number]                                       DATE: ______________________________________________________________________________________________     Please sign and return via fax to [phone]. Thank you, [facility].

## 2022-09-23 ENCOUNTER — TREATMENT (OUTPATIENT)
Dept: PHYSICAL THERAPY | Facility: CLINIC | Age: 37
End: 2022-09-23

## 2022-09-23 DIAGNOSIS — M66.0 BAKER'S CYST, RUPTURED: ICD-10-CM

## 2022-09-23 DIAGNOSIS — M79.604 RIGHT LEG PAIN: ICD-10-CM

## 2022-09-23 DIAGNOSIS — M79.661 RIGHT CALF PAIN: Primary | ICD-10-CM

## 2022-09-23 PROCEDURE — 97140 MANUAL THERAPY 1/> REGIONS: CPT | Performed by: PHYSICAL THERAPIST

## 2022-09-23 PROCEDURE — 97035 APP MDLTY 1+ULTRASOUND EA 15: CPT | Performed by: PHYSICAL THERAPIST

## 2022-09-23 NOTE — PROGRESS NOTES
Physical Therapy Daily Progress Note      Patient: Jeannine Delcid   : 1985  Diagnosis/ICD-10 Code:  Right calf pain [M79.661]   Problems Addressed this Visit    None     Visit Diagnoses     Right calf pain    -  Primary    Baker's cyst, ruptured        Right leg pain          Diagnoses       Codes Comments    Right calf pain    -  Primary ICD-10-CM: M79.661  ICD-9-CM: 729.5     Baker's cyst, ruptured     ICD-10-CM: M66.0  ICD-9-CM: 727.51     Right leg pain     ICD-10-CM: M79.604  ICD-9-CM: 729.5         Referring practitioner: EDISON Quiroga  Date of Initial Visit: Type: THERAPY  Noted: 2022  Today's Date: 2022    VISIT#: 2    Subjective   Jeannine reports she has been doing alright since eval.     Objective     See Exercise, Manual, and Modality Logs for complete treatment.   SCT: Advised pt of risks/benefits of kinesiotaping. Pt was advised to roll tape off skin to remove it after 2-5 days. They were instructed to seek medical care if s/s of severe skin irritation occur. I explicitly warned pt against gluing tape back down to skin if it begins to roll up or come off. Pt verbally consented to taping.       Assessment/Plan  Jeannine exhibits marble sized nodule at lateral aspect of popliteal space - indicative of cyst. When PT palpated spot, pt reported that seems to be the source of her pain. Pt is motivated and compliant with HEP so focused on MT today. Pt was encouraged to cont with LE stretching and hip strengthening.   Progress per Plan of Care         Timed:         Manual Therapy:    25     mins  89191; Including taping    Therapeutic Exercise:         mins  12369;     Neuromuscular Leona:        mins  32276;    Therapeutic Activity:          mins  15632;     Gait Training:           mins  21113;     Ultrasound:   10       mins  17386;    Ionto                                   mins   96683  Self Care                       3     mins   76809  Canalith Repos                   mins   58619  Tests & Measures              mins   95427      Un-Timed:  Electrical Stimulation:         mins  12915 ( );  Dry Needling          mins 16298/45298  Traction          mins 12960  Low Eval          Mins  72640  Mod Eval          Mins  57904  High Eval                            Mins  35298  Re-Eval                               mins  42681    Timed Treatment:   38   mins   Total Treatment:     38   mins    Leana Valerio, PT, DPT, cert. DN  Physical Therapist  [place] Lic # [id number]

## 2022-09-30 ENCOUNTER — TREATMENT (OUTPATIENT)
Dept: PHYSICAL THERAPY | Facility: CLINIC | Age: 37
End: 2022-09-30

## 2022-09-30 DIAGNOSIS — M79.604 RIGHT LEG PAIN: ICD-10-CM

## 2022-09-30 DIAGNOSIS — M79.661 RIGHT CALF PAIN: Primary | ICD-10-CM

## 2022-09-30 DIAGNOSIS — M66.0 BAKER'S CYST, RUPTURED: ICD-10-CM

## 2022-09-30 PROCEDURE — 97035 APP MDLTY 1+ULTRASOUND EA 15: CPT | Performed by: PHYSICAL THERAPIST

## 2022-09-30 PROCEDURE — 97140 MANUAL THERAPY 1/> REGIONS: CPT | Performed by: PHYSICAL THERAPIST

## 2022-09-30 NOTE — PROGRESS NOTES
Physical Therapy Daily Progress Note      Patient: Jeannine Delcid   : 1985  Diagnosis/ICD-10 Code:  Right calf pain [M79.661]   Problems Addressed this Visit    None     Visit Diagnoses     Right calf pain    -  Primary    Baker's cyst, ruptured        Right leg pain          Diagnoses       Codes Comments    Right calf pain    -  Primary ICD-10-CM: M79.661  ICD-9-CM: 729.5     Baker's cyst, ruptured     ICD-10-CM: M66.0  ICD-9-CM: 727.51     Right leg pain     ICD-10-CM: M79.604  ICD-9-CM: 729.5         Referring practitioner: EDISON Quiroga  Date of Initial Visit: Type: THERAPY  Noted: 2022  Today's Date: 2022    VISIT#: 3    Subjective   Jeannine reports she was feeling great after last tx session, virtually pain free. Then she went for a short run yesterday ~20 min and noticed a return of pain in lateral aspect of post R knee. She removed the tape after only 2 days, and also noticed a difference once it was off, the tape had been helpful in reducing sxs.     Objective     See Exercise, Manual, and Modality Logs for complete treatment.     Assessment/Plan  Jeannine is responding excellently to US, MT (TPR & cupping), and taping. She was instructed how to cut lattice strip so she can reapply at home if needed.   Progress per Plan of Care         Timed:         Manual Therapy:    20     mins  50226;     Therapeutic Exercise:         mins  73512;     Neuromuscular Leona:        mins  67467;    Therapeutic Activity:          mins  32303;     Gait Training:           mins  51770;     Ultrasound:     10     mins  70788;    Ionto                                   mins   67688  Self Care                       3     mins   69831 - see assessment  Canalith Repos                   mins  98033  Tests & Measures              mins   45633      Un-Timed:  Electrical Stimulation:         mins  39499 ( );  Dry Needling         mins /  Traction          mins 20634  Low Eval          Mins   43315  Mod Eval         Mins  53634  High Eval                            Mins  60362  Re-Eval                               mins  87520    Timed Treatment:   33   mins   Total Treatment:     33   mins    Leana Valerio, PT, DPT, cert. DN  Physical Therapist  [place] Lic # [id number]

## 2022-10-12 ENCOUNTER — TREATMENT (OUTPATIENT)
Dept: PHYSICAL THERAPY | Facility: CLINIC | Age: 37
End: 2022-10-12

## 2022-10-12 DIAGNOSIS — M79.661 RIGHT CALF PAIN: Primary | ICD-10-CM

## 2022-10-12 DIAGNOSIS — M66.0 BAKER'S CYST, RUPTURED: ICD-10-CM

## 2022-10-12 DIAGNOSIS — M79.604 RIGHT LEG PAIN: ICD-10-CM

## 2022-10-12 PROCEDURE — 97140 MANUAL THERAPY 1/> REGIONS: CPT | Performed by: PHYSICAL THERAPIST

## 2022-10-12 PROCEDURE — 97035 APP MDLTY 1+ULTRASOUND EA 15: CPT | Performed by: PHYSICAL THERAPIST

## 2022-10-12 NOTE — PROGRESS NOTES
Physical Therapy Daily Progress Note      Patient: Jeannine Delcid   : 1985  Diagnosis/ICD-10 Code:  Right calf pain [M79.661]   Problems Addressed this Visit    None  Visit Diagnoses     Right calf pain    -  Primary    Baker's cyst, ruptured        Right leg pain          Diagnoses       Codes Comments    Right calf pain    -  Primary ICD-10-CM: M79.661  ICD-9-CM: 729.5     Baker's cyst, ruptured     ICD-10-CM: M66.0  ICD-9-CM: 727.51     Right leg pain     ICD-10-CM: M79.604  ICD-9-CM: 729.5          Referring practitioner: EDISON Quiroga  Date of Initial Visit: Type: THERAPY  Noted: 2022  Today's Date: 10/12/2022    VISIT#: 4    Subjective Patient reports being able to do more and is pleased with progress. Still unable to run without increased pain in back of knee, but able to do daily normal activity.       Objective     See Exercise, Manual, and Modality Logs for complete treatment.     Assessment/Plan patient continues to respond well to US and manual interventions with decreased pain reported. Patient applying kinesiotape as needed at home.       Progress per Plan of Care and Progress strengthening /stabilization /functional activity         Timed:         Manual Therapy:    20     mins  15074;     Therapeutic Exercise:         mins  52228;     Neuromuscular Leona:        mins  59630;    Therapeutic Activity:          mins  49370;     Gait Training:           mins  05910;     Ultrasound:    10      mins  22473;    Ionto                                   mins   94851  Self Care                    _____  mins   00553  Canalith Repos                   mins  11198    Un-Timed:  Electrical Stimulation:         mins  34204 ( );  Dry Needling          mins self-pay   Traction          mins 27655    Timed Treatment:   30   mins   Total Treatment:     30   mins    Jean-Claude Jones PTA  [place] License [id number]  Physical Therapist Assistant

## 2022-10-14 ENCOUNTER — TELEPHONE (OUTPATIENT)
Dept: PHYSICAL THERAPY | Facility: CLINIC | Age: 37
End: 2022-10-14

## 2022-10-21 ENCOUNTER — TREATMENT (OUTPATIENT)
Dept: PHYSICAL THERAPY | Facility: CLINIC | Age: 37
End: 2022-10-21

## 2022-10-21 DIAGNOSIS — M79.604 RIGHT LEG PAIN: ICD-10-CM

## 2022-10-21 DIAGNOSIS — M66.0 BAKER'S CYST, RUPTURED: ICD-10-CM

## 2022-10-21 DIAGNOSIS — M79.661 RIGHT CALF PAIN: Primary | ICD-10-CM

## 2022-10-21 PROCEDURE — 97112 NEUROMUSCULAR REEDUCATION: CPT | Performed by: PHYSICAL THERAPIST

## 2022-10-21 PROCEDURE — 97035 APP MDLTY 1+ULTRASOUND EA 15: CPT | Performed by: PHYSICAL THERAPIST

## 2022-10-21 NOTE — PROGRESS NOTES
Physical Therapy Daily Treatment Note  [address], [address], [place]  [address]    Patient: Jeannine Delcid   : 1985  Diagnosis/ICD-10 Code:  Right calf pain [M79.661]   Problems Addressed this Visit    None  Visit Diagnoses     Right calf pain    -  Primary    Baker's cyst, ruptured        Right leg pain          Diagnoses       Codes Comments    Right calf pain    -  Primary ICD-10-CM: M79.661  ICD-9-CM: 729.5     Baker's cyst, ruptured     ICD-10-CM: M66.0  ICD-9-CM: 727.51     Right leg pain     ICD-10-CM: M79.604  ICD-9-CM: 729.5         Referring practitioner: EDISON Quiroga  Date of Initial Visit: Type: THERAPY  Noted: 2022  Today's Date: 10/21/2022    VISIT#: 5    Subjective   Before last tx, the knee was hurting a little before but also afterward. She slept on the couch for 2 nights which may have contributed to knee pain. Jeannine reports work has been really busy so she's been walking a lot. The R knee is sore when she gets home but a little ibuprofen helps. She took her tape off 3 days ago. She's been too busy to run lately. 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     Objective     See Exercise, Manual, and Modality Logs for complete treatment.   Pelvic spring test: prone excessive ant/sup spring when force applied to R IT, absent spring on L IT indicative of pelvic obliquity (R post & L ant rotation)    Assessment/Plan  Jeannine exhibits pelvic obliquity which may be contributing to symptoms. During MET she remarked it felt like the activity was targeting where her symptoms are coming from. She was advised to do MET once daily x 5 days then every other day until she returns to PT.   SCT: Pt was provided with a hard copy of the HEP and instructed in use of it and all listed exercises.  Pt was instructed to cease any exercise that was painful, or that feels as though the form is incorrect.  Pt will return with any questions  or difficulty at next session.  Pt acknowledged these instructions and agreed.View at [email or web address] using code: [email or web address] (iso R hip flexion, L hip ext MET w/& w/o dowel lucius)  Progress per Plan of Care         Timed:         Manual Therapy:         mins  16565;     Therapeutic Exercise:         mins  24201;     Neuromuscular Leona:    15    mins  60911;    Therapeutic Activity:          mins  67667;     Gait Training:           mins  95943;     Ultrasound:       10   mins  13092;    Ionto                                   mins   48358  Self Care                            mins   21872  Canalith Repos                   mins  67893  Tests & Measures              mins   75154      Un-Timed:  Electrical Stimulation:         mins  00739 ( );  Dry Needling          mins 78319/95546  Traction          mins 80527  Low Eval          Mins  09015  Mod Eval          Mins  14868  High Eval                            Mins  14732  Re-Eval                               mins  84752    Timed Treatment:   25   mins   Total Treatment:     25   mins    Leana Valerio, PT, DPT, cert. DN  Physical Therapist  [place] Lic # [id number]

## 2022-10-28 ENCOUNTER — TREATMENT (OUTPATIENT)
Dept: PHYSICAL THERAPY | Facility: CLINIC | Age: 37
End: 2022-10-28

## 2022-10-28 DIAGNOSIS — M66.0 BAKER'S CYST, RUPTURED: ICD-10-CM

## 2022-10-28 DIAGNOSIS — M79.604 RIGHT LEG PAIN: ICD-10-CM

## 2022-10-28 DIAGNOSIS — M79.661 RIGHT CALF PAIN: Primary | ICD-10-CM

## 2022-10-28 PROCEDURE — 97035 APP MDLTY 1+ULTRASOUND EA 15: CPT | Performed by: PHYSICAL THERAPIST

## 2022-10-28 PROCEDURE — 97140 MANUAL THERAPY 1/> REGIONS: CPT | Performed by: PHYSICAL THERAPIST

## 2022-10-28 NOTE — PROGRESS NOTES
Physical Therapy Daily Treatment Note  [address], [address], [place]  [address]    Patient: Jeannine Delcid   : 1985  Diagnosis/ICD-10 Code:  Right calf pain [M79.661]   Problems Addressed this Visit    None  Visit Diagnoses     Right calf pain    -  Primary    Baker's cyst, ruptured        Right leg pain          Diagnoses       Codes Comments    Right calf pain    -  Primary ICD-10-CM: M79.661  ICD-9-CM: 729.5     Baker's cyst, ruptured     ICD-10-CM: M66.0  ICD-9-CM: 727.51     Right leg pain     ICD-10-CM: M79.604  ICD-9-CM: 729.5         Referring practitioner: EDISON Quiroga  Date of Initial Visit: Type: THERAPY  Noted: 2022  Today's Date: 10/28/2022    VISIT#: 6    Subjective   Jeannine reports her leg has been feeling really good and has been pain free even with exercise.     Objective     See Exercise, Manual, and Modality Logs for complete treatment.   LEFS: 73/80 = 91% function  Assessment/Plan  Cyst still notable at lateral popliteal space. However, pt has been pain free since last tx session. Pt is opting for 30 day trial hold from clinical treatment. Pt was advised to contact clinic within 30 days if symptoms return/regress, to get back on schedule. If not heard from in 30 days, pt was informed they will be DC. Pt consents to plan.         Timed:         Manual Therapy:    15     mins  08769; Including taping    Therapeutic Exercise:         mins  68981;     Neuromuscular Leona:        mins  28472;    Therapeutic Activity:          mins  27742;     Gait Training:           mins  87130;     Ultrasound:   10       mins  18336;    Ionto                                   mins   91777  Self Care                            mins   04970  Canalith Repos                   mins  53972  Tests & Measures              mins   64280      Un-Timed:  Electrical Stimulation:         mins  49061 ( );  Dry Needling          mins /  Traction          mins 55898  Low Eval           Mins  63186  Mod Eval          Mins  98670  High Eval                            Mins  96400  Re-Eval                               mins  68717    Timed Treatment:   35   mins   Total Treatment:     35   mins    Leana Valerio, PT, DPT, cert. DN  Physical Therapist  [place] Lic # [id number]

## 2022-12-01 ENCOUNTER — DOCUMENTATION (OUTPATIENT)
Dept: PHYSICAL THERAPY | Facility: CLINIC | Age: 37
End: 2022-12-01

## 2022-12-01 DIAGNOSIS — M66.0 BAKER'S CYST, RUPTURED: ICD-10-CM

## 2022-12-01 DIAGNOSIS — M79.661 RIGHT CALF PAIN: Primary | ICD-10-CM

## 2022-12-01 DIAGNOSIS — M79.604 RIGHT LEG PAIN: ICD-10-CM

## 2022-12-01 NOTE — PROGRESS NOTES
Discharge Summary  Discharge Summary from Physical Therapy Report                               [address], [address], [place]  [address]    Dates  PT visit: 9/21/22-10/28/22  Number of Visits: 6         Goals: Partially Met  Plan Goals: STG to be met in 4 wks  1. Pt will be safe, independent, and compliant with HEP to optimize recovery and self management of symptoms. - MET  2. Pt will demonstrate at least 70 deg ER at R hip in 90/90 for balanced joint mechanics and reduced aberrant forces during ambulation. - MET  3. Pt will demonstrate absence of R popliteal tenderness to reflect tissue healing. - Partially met, mild tenderness    LTG to be met in 12 wks  1. Pt will improve LEFS from 75% impairment to no greater than 45% impairment. - MET, 9% impairment   2. Pt will demonstrate at least 70 deg ER at R hip in 90/90 for balanced joint mechanics and reduced aberrant forces during ambulation.- MET  3. Pt will demonstrate absence of R popliteal tenderness to reflect tissue healing. - Mild  4. Pt will improve RLE strength grossly to at least 4+/5 to reduce abnormal joint & soft tissue stresses. - Partially met, 4/5  Discharge Plan: Continue with current home exercise program as instructed    Comments Pt opted for trial hold  after last treatment session. Pt was advised to contact PT if trial hold was unsuccessful. Pt did not contact clinic and thus will be DC at this time. No formal reassessment was performed since patient did not return to clinic. Some information may be carried over from previous sessions and may not reflect current status    Date of Discharge 12/1/22        Leana Valerio, PT, DPT, cert. DN  Physical Therapist  [place] Lic# [id number]

## 2023-09-05 ENCOUNTER — OFFICE (OUTPATIENT)
Dept: URBAN - METROPOLITAN AREA CLINIC 64 | Facility: CLINIC | Age: 38
End: 2023-09-05

## 2023-09-05 VITALS
HEART RATE: 89 BPM | DIASTOLIC BLOOD PRESSURE: 80 MMHG | SYSTOLIC BLOOD PRESSURE: 124 MMHG | HEIGHT: 67 IN | WEIGHT: 135 LBS

## 2023-09-05 DIAGNOSIS — R11.2 NAUSEA WITH VOMITING, UNSPECIFIED: ICD-10-CM

## 2023-09-05 DIAGNOSIS — K58.1 IRRITABLE BOWEL SYNDROME WITH CONSTIPATION: ICD-10-CM

## 2023-09-05 DIAGNOSIS — R10.9 UNSPECIFIED ABDOMINAL PAIN: ICD-10-CM

## 2023-09-05 DIAGNOSIS — K62.5 HEMORRHAGE OF ANUS AND RECTUM: ICD-10-CM

## 2023-09-05 DIAGNOSIS — R19.7 DIARRHEA, UNSPECIFIED: ICD-10-CM

## 2023-09-05 DIAGNOSIS — K21.9 GASTRO-ESOPHAGEAL REFLUX DISEASE WITHOUT ESOPHAGITIS: ICD-10-CM

## 2023-09-05 PROCEDURE — 99204 OFFICE O/P NEW MOD 45 MIN: CPT | Performed by: INTERNAL MEDICINE

## 2023-09-05 RX ORDER — OMEPRAZOLE 40 MG/1
40 CAPSULE, DELAYED RELEASE ORAL
Qty: 30 | Refills: 11 | Status: COMPLETED
Start: 2023-09-05 | End: 2023-10-11

## 2024-06-14 ENCOUNTER — HOSPITAL ENCOUNTER (EMERGENCY)
Facility: HOSPITAL | Age: 39
Discharge: HOME | End: 2024-06-14
Payer: COMMERCIAL

## 2024-06-14 ENCOUNTER — APPOINTMENT (OUTPATIENT)
Dept: CT IMAGING | Facility: HOSPITAL | Age: 39
End: 2024-06-14
Payer: COMMERCIAL

## 2024-06-14 VITALS
TEMPERATURE: 97.5 F | HEART RATE: 78 BPM | WEIGHT: 131 LBS | SYSTOLIC BLOOD PRESSURE: 109 MMHG | OXYGEN SATURATION: 99 % | HEIGHT: 68 IN | DIASTOLIC BLOOD PRESSURE: 70 MMHG | BODY MASS INDEX: 19.85 KG/M2 | RESPIRATION RATE: 16 BRPM

## 2024-06-14 DIAGNOSIS — R10.84 GENERALIZED ABDOMINAL PAIN: Primary | ICD-10-CM

## 2024-06-14 DIAGNOSIS — R19.7 DIARRHEA, UNSPECIFIED TYPE: ICD-10-CM

## 2024-06-14 LAB
ALBUMIN SERPL-MCNC: 4.6 G/DL (ref 3.5–5.2)
ALBUMIN/GLOB SERPL: 1.6 G/DL
ALP SERPL-CCNC: 74 U/L (ref 39–117)
ALT SERPL W P-5'-P-CCNC: 9 U/L (ref 1–33)
ANION GAP SERPL CALCULATED.3IONS-SCNC: 11.6 MMOL/L (ref 5–15)
AST SERPL-CCNC: 18 U/L (ref 1–32)
BASOPHILS # BLD AUTO: 0.04 10*3/MM3 (ref 0–0.2)
BASOPHILS NFR BLD AUTO: 0.7 % (ref 0–1.5)
BILIRUB SERPL-MCNC: 0.3 MG/DL (ref 0–1.2)
BILIRUB UR QL STRIP: NEGATIVE
BUN SERPL-MCNC: 14 MG/DL (ref 6–20)
BUN/CREAT SERPL: 19.4 (ref 7–25)
CALCIUM SPEC-SCNC: 9.9 MG/DL (ref 8.6–10.5)
CHLORIDE SERPL-SCNC: 103 MMOL/L (ref 98–107)
CLARITY UR: CLEAR
CO2 SERPL-SCNC: 25.4 MMOL/L (ref 22–29)
COLOR UR: YELLOW
CREAT SERPL-MCNC: 0.72 MG/DL (ref 0.57–1)
DEPRECATED RDW RBC AUTO: 39.4 FL (ref 37–54)
EGFRCR SERPLBLD CKD-EPI 2021: 108.6 ML/MIN/1.73
EOSINOPHIL # BLD AUTO: 0.09 10*3/MM3 (ref 0–0.4)
EOSINOPHIL NFR BLD AUTO: 1.7 % (ref 0.3–6.2)
ERYTHROCYTE [DISTWIDTH] IN BLOOD BY AUTOMATED COUNT: 11.9 % (ref 12.3–15.4)
GLOBULIN UR ELPH-MCNC: 2.8 GM/DL
GLUCOSE SERPL-MCNC: 96 MG/DL (ref 65–99)
GLUCOSE UR STRIP-MCNC: NEGATIVE MG/DL
HCT VFR BLD AUTO: 42.3 % (ref 34–46.6)
HGB BLD-MCNC: 13.7 G/DL (ref 12–15.9)
HGB UR QL STRIP.AUTO: NEGATIVE
HOLD SPECIMEN: NORMAL
IMM GRANULOCYTES # BLD AUTO: 0.02 10*3/MM3 (ref 0–0.05)
IMM GRANULOCYTES NFR BLD AUTO: 0.4 % (ref 0–0.5)
KETONES UR QL STRIP: ABNORMAL
LEUKOCYTE ESTERASE UR QL STRIP.AUTO: NEGATIVE
LIPASE SERPL-CCNC: 27 U/L (ref 13–60)
LYMPHOCYTES # BLD AUTO: 1.89 10*3/MM3 (ref 0.7–3.1)
LYMPHOCYTES NFR BLD AUTO: 35 % (ref 19.6–45.3)
MCH RBC QN AUTO: 29.3 PG (ref 26.6–33)
MCHC RBC AUTO-ENTMCNC: 32.4 G/DL (ref 31.5–35.7)
MCV RBC AUTO: 90.6 FL (ref 79–97)
MONOCYTES # BLD AUTO: 0.47 10*3/MM3 (ref 0.1–0.9)
MONOCYTES NFR BLD AUTO: 8.7 % (ref 5–12)
NEUTROPHILS NFR BLD AUTO: 2.89 10*3/MM3 (ref 1.7–7)
NEUTROPHILS NFR BLD AUTO: 53.5 % (ref 42.7–76)
NITRITE UR QL STRIP: NEGATIVE
NRBC BLD AUTO-RTO: 0 /100 WBC (ref 0–0.2)
PH UR STRIP.AUTO: <=5 [PH] (ref 5–8)
PLATELET # BLD AUTO: 214 10*3/MM3 (ref 140–450)
PMV BLD AUTO: 9.7 FL (ref 6–12)
POTASSIUM SERPL-SCNC: 4 MMOL/L (ref 3.5–5.2)
PROT SERPL-MCNC: 7.4 G/DL (ref 6–8.5)
PROT UR QL STRIP: NEGATIVE
RBC # BLD AUTO: 4.67 10*6/MM3 (ref 3.77–5.28)
SODIUM SERPL-SCNC: 140 MMOL/L (ref 136–145)
SP GR UR STRIP: 1.02 (ref 1–1.03)
UROBILINOGEN UR QL STRIP: ABNORMAL
WBC NRBC COR # BLD AUTO: 5.4 10*3/MM3 (ref 3.4–10.8)
WHOLE BLOOD HOLD COAG: NORMAL

## 2024-06-14 PROCEDURE — 96374 THER/PROPH/DIAG INJ IV PUSH: CPT

## 2024-06-14 PROCEDURE — 85025 COMPLETE CBC W/AUTO DIFF WBC: CPT

## 2024-06-14 PROCEDURE — 81003 URINALYSIS AUTO W/O SCOPE: CPT

## 2024-06-14 PROCEDURE — 25810000003 SODIUM CHLORIDE 0.9 % SOLUTION

## 2024-06-14 PROCEDURE — 74176 CT ABD & PELVIS W/O CONTRAST: CPT

## 2024-06-14 PROCEDURE — 25010000002 ONDANSETRON PER 1 MG

## 2024-06-14 PROCEDURE — 83690 ASSAY OF LIPASE: CPT

## 2024-06-14 PROCEDURE — 99284 EMERGENCY DEPT VISIT MOD MDM: CPT

## 2024-06-14 PROCEDURE — 80053 COMPREHEN METABOLIC PANEL: CPT

## 2024-06-14 RX ORDER — SODIUM CHLORIDE 0.9 % (FLUSH) 0.9 %
10 SYRINGE (ML) INJECTION AS NEEDED
Status: DISCONTINUED | OUTPATIENT
Start: 2024-06-14 | End: 2024-06-14 | Stop reason: HOSPADM

## 2024-06-14 RX ORDER — ONDANSETRON 2 MG/ML
4 INJECTION INTRAMUSCULAR; INTRAVENOUS ONCE
Status: COMPLETED | OUTPATIENT
Start: 2024-06-14 | End: 2024-06-14

## 2024-06-14 RX ADMIN — ONDANSETRON 4 MG: 2 INJECTION INTRAMUSCULAR; INTRAVENOUS at 11:48

## 2024-06-14 RX ADMIN — Medication 10 ML: at 11:48

## 2024-06-14 RX ADMIN — SODIUM CHLORIDE 500 ML: 0.9 INJECTION, SOLUTION INTRAVENOUS at 11:49

## 2024-06-14 NOTE — ED PROVIDER NOTES
"Subjective   History of Present Illness  Chief Complaint: Abdominal pain, diarrhea      HPI: Patient is a 38-year-old female presents by private vehicle with complaints of abdominal discomfort described as cramping as well as diarrhea she states that the symptoms been ongoing for \"a long time\" she was supposed to have a colonoscopy completed last year but just before the procedure she left without further evaluation and has not since followed up.  Over the last few days she has had increased diarrhea as well as episodes of vomiting last night.  Has had no episodes of vomiting today, reportedly had streaks of bright red in her stool.  She attributes her avoidance of follow-up related to her anxiety.    PCP: Juancarlos     History provided by:  Patient      Review of Systems  See above as noted     Past Medical History:   Diagnosis Date    Acute UTI     Allergic     Bacterial vaginitis     Condition not found     CONDITION THAT CAUSES CYST GROWTH ON VARIOUS PARTS OF BODY PER PT.    Pharyngitis, acute     Vaginal discharge     Visual impairment        No Known Allergies    Past Surgical History:   Procedure Laterality Date    COSMETIC SURGERY      ON FACE TO REMOVE FACE    LAPAROSCOPIC OVARIAN CYSTECTOMY         Family History   Problem Relation Age of Onset    Anxiety disorder Mother     Depression Mother     Hyperlipidemia Mother     Thyroid disease Mother     Heart disease Father     Hypertension Father     Asthma Sister     Anxiety disorder Sister     Mental illness Sister     Miscarriages / Stillbirths Sister     Thyroid disease Sister     Learning disabilities Son     Anxiety disorder Son     Alcohol abuse Maternal Grandmother     Heart disease Maternal Grandmother     Heart disease Maternal Grandfather        Social History     Socioeconomic History    Marital status:    Tobacco Use    Smoking status: Former     Passive exposure: Past    Smokeless tobacco: Never    Tobacco comments:     quit 2 months ago and " "uses patch   Vaping Use    Vaping status: Never Used   Substance and Sexual Activity    Alcohol use: Yes    Drug use: Not Currently    Sexual activity: Yes     Partners: Male           Objective   Physical Exam  Vitals reviewed.   Constitutional:       General: She is not in acute distress.     Appearance: She is not toxic-appearing.   Cardiovascular:      Rate and Rhythm: Normal rate.      Heart sounds: No murmur heard.  Abdominal:      Tenderness: There is abdominal tenderness in the left upper quadrant.   Skin:     General: Skin is warm and dry.   Neurological:      General: No focal deficit present.      Mental Status: She is alert and oriented to person, place, and time. Mental status is at baseline.         Procedures           ED Course  ED Course as of 06/14/24 1327   Fri Jun 14, 2024   1126 Patient evaluated effing placed in a room from triage. [BH]      ED Course User Index  [] Ekaterina Hoover, EDISON                                 /70   Pulse 78   Temp 97.5 °F (36.4 °C) (Oral)   Resp 16   Ht 172.7 cm (68\")   Wt 59.4 kg (131 lb)   SpO2 99%   BMI 19.92 kg/m²   Labs Reviewed   URINALYSIS W/ MICROSCOPIC IF INDICATED (NO CULTURE) - Abnormal; Notable for the following components:       Result Value    Ketones, UA Trace (*)     All other components within normal limits    Narrative:     Urine microscopic not indicated.   CBC WITH AUTO DIFFERENTIAL - Abnormal; Notable for the following components:    RDW 11.9 (*)     All other components within normal limits   LIPASE - Normal   COMPREHENSIVE METABOLIC PANEL    Narrative:     GFR Normal >60  Chronic Kidney Disease <60  Kidney Failure <15     CBC AND DIFFERENTIAL    Narrative:     The following orders were created for panel order CBC & Differential.  Procedure                               Abnormality         Status                     ---------                               -----------         ------                     CBC Auto " Differential[[id number]]        Abnormal            Final result                 Please view results for these tests on the individual orders.   EXTRA TUBES    Narrative:     The following orders were created for panel order Extra Tubes.  Procedure                               Abnormality         Status                     ---------                               -----------         ------                     Gold Top - SST[[id number]]                                   Final result               Light Blue Top[[id number]]                                   Final result                 Please view results for these tests on the individual orders.   GOLD TOP - SST   LIGHT BLUE TOP     Medications   sodium chloride 0.9 % flush 10 mL (10 mL Intravenous Given 6/14/24 1148)   sodium chloride 0.9 % bolus 500 mL (0 mL Intravenous Stopped 6/14/24 1240)   ondansetron (ZOFRAN) injection 4 mg (4 mg Intravenous Given 6/14/24 1148)     CT Abdomen Pelvis Without Contrast    Result Date: 6/14/2024  Impression: No acute abnormality in the abdomen or pelvis. Electronically Signed: Shimon Benito MD  6/14/2024 12:34 PM EDT  Workstation ID: [id number]               Medical Decision Making  Patient presented with above complaints, noted physical exam was completed and IV was established CBC notes no anemia no leukocytosis CMP notes no acute renal insufficiency or electrolyte imbalance urinalysis negative for urinary tract infection did note ketones she was given a fluid bolus.  CT of the abdomen pelvis was obtained differentials considered and all inclusive colitis, diverticulitis, perforation, cystitis.  At bedside discussed the ED findings with the patient we did discuss possible viral etiology of her increased symptoms encouraged her to follow-up with gastroenterology and have the colonoscopy completed as recommended.  Patient gave verbal understanding, denied further questions or complaints.    Chart review: 6/14/2024 seen at urgent care  diagnosed with chronic gastritis, nausea vomiting medications were changed including omeprazole and Zofran.      Note Disclaimer: At [facility], we believe that sharing information builds trust and better  relationships. You are receiving this note because you recently visited [facility]. It is possible you will see health information before a provider has talked with you about it. This kind of information can be easy to misunderstand. To help you fully understand what it means for your health, we urge you to discuss this note with your provider.       Part of this note may be an electronic transcription/translation of spoken language to printed text using the Dragon Dictation System.    Appropriate PPE worn during exam.    Amount and/or Complexity of Data Reviewed  Labs: ordered. Decision-making details documented in ED Course.  Radiology:  Decision-making details documented in ED Course.    Risk  Prescription drug management.        Final diagnoses:   Generalized abdominal pain   Diarrhea, unspecified type       ED Disposition  ED Disposition       ED Disposition   Discharge    Condition   Stable    Comment   --               Radha Bauer, APRN  [address] [address] [phone]          [facility] [place] [address] [facility] [address] [phone]    As needed         Medication List      No changes were made to your prescriptions during this visit.            Ekaterina Hoover, APRN  06/14/24 8444

## 2024-06-26 ENCOUNTER — HOSPITAL ENCOUNTER (EMERGENCY)
Facility: HOSPITAL | Age: 39
Discharge: HOME | End: 2024-06-27
Attending: EMERGENCY MEDICINE
Payer: COMMERCIAL

## 2024-06-26 DIAGNOSIS — K59.00 CONSTIPATION, UNSPECIFIED CONSTIPATION TYPE: Primary | ICD-10-CM

## 2024-06-26 PROCEDURE — 99284 EMERGENCY DEPT VISIT MOD MDM: CPT

## 2024-06-27 VITALS
HEART RATE: 102 BPM | HEIGHT: 67 IN | TEMPERATURE: 97.7 F | RESPIRATION RATE: 17 BRPM | WEIGHT: 134.26 LBS | OXYGEN SATURATION: 99 % | BODY MASS INDEX: 21.07 KG/M2 | DIASTOLIC BLOOD PRESSURE: 60 MMHG | SYSTOLIC BLOOD PRESSURE: 105 MMHG

## 2024-06-27 NOTE — ED PROVIDER NOTES
Subjective   History of Present Illness  38-year-old female with constipation for the past 1 week mild abdominal bloating, no vomiting, no nausea.  Patient was seen here for diarrhea and abdominal pain 6/14/2024 and had a normal CT of the abdomen and pelvis.      Review of Systems   Gastrointestinal:  Positive for constipation.       Past Medical History:   Diagnosis Date    Acute UTI     Allergic     Bacterial vaginitis     Condition not found     CONDITION THAT CAUSES CYST GROWTH ON VARIOUS PARTS OF BODY PER PT.    Pharyngitis, acute     Vaginal discharge     Visual impairment        No Known Allergies    Past Surgical History:   Procedure Laterality Date    COSMETIC SURGERY      ON FACE TO REMOVE FACE    LAPAROSCOPIC OVARIAN CYSTECTOMY         Family History   Problem Relation Age of Onset    Anxiety disorder Mother     Depression Mother     Hyperlipidemia Mother     Thyroid disease Mother     Heart disease Father     Hypertension Father     Asthma Sister     Anxiety disorder Sister     Mental illness Sister     Miscarriages / Stillbirths Sister     Thyroid disease Sister     Learning disabilities Son     Anxiety disorder Son     Alcohol abuse Maternal Grandmother     Heart disease Maternal Grandmother     Heart disease Maternal Grandfather        Social History     Socioeconomic History    Marital status:    Tobacco Use    Smoking status: Former     Passive exposure: Past    Smokeless tobacco: Never    Tobacco comments:     quit 2 months ago and uses patch   Vaping Use    Vaping status: Never Used   Substance and Sexual Activity    Alcohol use: Yes    Drug use: Not Currently    Sexual activity: Yes     Partners: Male           Objective   Physical Exam  Constitutional:       Appearance: Normal appearance.   HENT:      Head: Normocephalic and atraumatic.   Cardiovascular:      Rate and Rhythm: Normal rate and regular rhythm.      Heart sounds: Normal heart sounds.   Pulmonary:      Effort: Pulmonary effort  is normal.      Breath sounds: Normal breath sounds.   Abdominal:      General: Bowel sounds are normal. There is no distension.      Palpations: Abdomen is soft.      Tenderness: There is no abdominal tenderness.   Genitourinary:     Comments: Small stool impaction, stool brown  Skin:     General: Skin is warm and dry.      Capillary Refill: Capillary refill takes less than 2 seconds.   Neurological:      General: No focal deficit present.      Mental Status: She is alert and oriented to person, place, and time.         Procedures           ED Course                                             Medical Decision Making  Patient had bowel movement with enema and feels somewhat better, request discharge.  Return precautions reviewed in detail.    Problems Addressed:  Constipation, unspecified constipation type: acute illness or injury        Final diagnoses:   Constipation, unspecified constipation type       ED Disposition  ED Disposition       ED Disposition   Discharge    Condition   Stable    Comment   --               Radha Bauer, APRN  [address] [place] [address]  [phone]      As needed         Medication List        Stop      loperamide 2 MG capsule  Commonly known as: IMODIUM                 Hussein Esteban MD  06/27/24 5530

## 2024-08-02 ENCOUNTER — OFFICE (OUTPATIENT)
Dept: URBAN - METROPOLITAN AREA PATHOLOGY 19 | Facility: PATHOLOGY | Age: 39
End: 2024-08-02
Payer: COMMERCIAL

## 2024-08-02 ENCOUNTER — ON CAMPUS - OUTPATIENT (OUTPATIENT)
Dept: URBAN - METROPOLITAN AREA HOSPITAL 2 | Facility: HOSPITAL | Age: 39
End: 2024-08-02
Payer: COMMERCIAL

## 2024-08-02 VITALS
HEART RATE: 94 BPM | RESPIRATION RATE: 18 BRPM | SYSTOLIC BLOOD PRESSURE: 118 MMHG | RESPIRATION RATE: 14 BRPM | HEART RATE: 88 BPM | RESPIRATION RATE: 16 BRPM | HEART RATE: 83 BPM | HEART RATE: 99 BPM | HEART RATE: 95 BPM | HEIGHT: 67 IN | RESPIRATION RATE: 19 BRPM | TEMPERATURE: 97.8 F | HEART RATE: 89 BPM | SYSTOLIC BLOOD PRESSURE: 95 MMHG | DIASTOLIC BLOOD PRESSURE: 63 MMHG | SYSTOLIC BLOOD PRESSURE: 101 MMHG | DIASTOLIC BLOOD PRESSURE: 59 MMHG | DIASTOLIC BLOOD PRESSURE: 56 MMHG | SYSTOLIC BLOOD PRESSURE: 115 MMHG | SYSTOLIC BLOOD PRESSURE: 100 MMHG | OXYGEN SATURATION: 100 % | SYSTOLIC BLOOD PRESSURE: 96 MMHG | HEART RATE: 87 BPM | DIASTOLIC BLOOD PRESSURE: 65 MMHG | OXYGEN SATURATION: 99 % | DIASTOLIC BLOOD PRESSURE: 58 MMHG | HEART RATE: 104 BPM | DIASTOLIC BLOOD PRESSURE: 78 MMHG | SYSTOLIC BLOOD PRESSURE: 110 MMHG | HEART RATE: 100 BPM | DIASTOLIC BLOOD PRESSURE: 87 MMHG | RESPIRATION RATE: 17 BRPM | SYSTOLIC BLOOD PRESSURE: 98 MMHG | WEIGHT: 130 LBS

## 2024-08-02 DIAGNOSIS — K62.1 RECTAL POLYP: ICD-10-CM

## 2024-08-02 DIAGNOSIS — R10.9 UNSPECIFIED ABDOMINAL PAIN: ICD-10-CM

## 2024-08-02 DIAGNOSIS — K62.5 HEMORRHAGE OF ANUS AND RECTUM: ICD-10-CM

## 2024-08-02 DIAGNOSIS — R19.7 DIARRHEA, UNSPECIFIED: ICD-10-CM

## 2024-08-02 DIAGNOSIS — K59.1 FUNCTIONAL DIARRHEA: ICD-10-CM

## 2024-08-02 LAB
GI HISTOLOGY: A. WHOLE COLON: (no result)
GI HISTOLOGY: B. RECTUM: (no result)
GI HISTOLOGY: PDF REPORT: (no result)

## 2024-08-02 PROCEDURE — 45380 COLONOSCOPY AND BIOPSY: CPT | Mod: 59 | Performed by: INTERNAL MEDICINE

## 2024-08-02 PROCEDURE — 88305 TISSUE EXAM BY PATHOLOGIST: CPT | Performed by: PATHOLOGY

## 2024-08-02 PROCEDURE — 45385 COLONOSCOPY W/LESION REMOVAL: CPT | Performed by: INTERNAL MEDICINE

## 2024-09-30 ENCOUNTER — OFFICE VISIT (OUTPATIENT)
Dept: FAMILY MEDICINE CLINIC | Facility: CLINIC | Age: 39
End: 2024-09-30
Payer: COMMERCIAL

## 2024-09-30 VITALS
DIASTOLIC BLOOD PRESSURE: 69 MMHG | SYSTOLIC BLOOD PRESSURE: 100 MMHG | TEMPERATURE: 97.8 F | BODY MASS INDEX: 21.03 KG/M2 | WEIGHT: 134 LBS | HEIGHT: 67 IN | OXYGEN SATURATION: 97 % | HEART RATE: 99 BPM

## 2024-09-30 DIAGNOSIS — F41.9 ANXIETY: Primary | ICD-10-CM

## 2024-09-30 PROCEDURE — 99214 OFFICE O/P EST MOD 30 MIN: CPT | Performed by: NURSE PRACTITIONER

## 2024-09-30 RX ORDER — HYDROXYZINE HYDROCHLORIDE 25 MG/1
25 TABLET, FILM COATED ORAL 3 TIMES DAILY PRN
Qty: 30 TABLET | Refills: 0 | Status: SHIPPED | OUTPATIENT
Start: 2024-09-30

## 2024-09-30 RX ORDER — SERTRALINE HYDROCHLORIDE 25 MG/1
25 TABLET, FILM COATED ORAL DAILY
Qty: 30 TABLET | Refills: 0 | Status: SHIPPED | OUTPATIENT
Start: 2024-09-30

## 2024-09-30 NOTE — PROGRESS NOTES
"Aileen Delcid is a 39 y.o. female.     History of Present Illness  Pt is here today with c/o anxiety.   She states this is a chronic issue.  She states she took lexapro when she was around 17 yo  She only took it for a short duration.  She has been a little reluctant to take meds  She believes she has ADHD.  She states she has trouble staying on track with tasks.  She works in [employer]- she travels around to different stores  She works alone  She has some depression.   Denies SI or HI  She has a lot of stress worrying about her children.       The following portions of the patient's history were reviewed and updated as appropriate: allergies, current medications, past family history, past medical history, past social history, past surgical history, and problem list.    Review of Systems   Constitutional:  Negative for chills, fatigue and fever.   Respiratory:  Negative for chest tightness and shortness of breath.    Cardiovascular:  Positive for palpitations (anxiety driven). Negative for chest pain.   Neurological:  Negative for dizziness and headache.   Psychiatric/Behavioral:  Positive for depressed mood and stress. Negative for self-injury and suicidal ideas. The patient is nervous/anxious.        Objective     /69 (BP Location: Left arm, Patient Position: Sitting, Cuff Size: Adult)   Pulse 99   Temp 97.8 °F (36.6 °C) (Temporal)   Ht 170.2 cm (67\")   Wt 60.8 kg (134 lb)   SpO2 97%   BMI 20.99 kg/m²     Current Outpatient Medications on File Prior to Visit   Medication Sig Dispense Refill    bismuth subsalicylate (PEPTO BISMOL) 262 MG/15ML suspension Take 15 mL by mouth Every 6 (Six) Hours As Needed for Indigestion. (Patient not taking: Reported on 9/30/2024)      dicyclomine (Bentyl) 10 MG capsule Take 1 capsule by mouth 4 (Four) Times a Day Before Meals & at Bedtime As Needed (abdominal cramping). (Patient not taking: Reported on 9/30/2024) 30 capsule 0    ondansetron ODT " (ZOFRAN-ODT) 4 MG disintegrating tablet 1 to 2 tablets p.o. every 4 to 6 hours as needed nausea and vomiting (Patient not taking: Reported on 9/30/2024) 30 tablet 0    pantoprazole (PROTONIX) 40 MG EC tablet Take 1 tablet by mouth Daily. (Patient not taking: Reported on 9/30/2024) 30 tablet 0     No current facility-administered medications on file prior to visit.        BMI is within normal parameters. No other follow-up for BMI required.       Physical Exam  Constitutional:       General: She is not in acute distress.     Appearance: Normal appearance. She is not ill-appearing.   HENT:      Head: Normocephalic and atraumatic.   Eyes:      Extraocular Movements: Extraocular movements intact.   Cardiovascular:      Rate and Rhythm: Normal rate and regular rhythm.      Heart sounds: No murmur heard.  Pulmonary:      Effort: Pulmonary effort is normal. No respiratory distress.   Musculoskeletal:         General: Normal range of motion.   Skin:     General: Skin is warm and dry.   Neurological:      General: No focal deficit present.      Mental Status: She is alert and oriented to person, place, and time.   Psychiatric:         Mood and Affect: Mood normal.         Behavior: Behavior normal.         Thought Content: Thought content normal.         Judgment: Judgment normal.           Assessment & Plan     Diagnoses and all orders for this visit:    1. Anxiety (Primary)  Comments:  worsening issue  start zoloft 25mg daily  hydroxyzine as needed  denies SI or HI  f/u 1 mo  Orders:  -     sertraline (Zoloft) 25 MG tablet; Take 1 tablet by mouth Daily.  Dispense: 30 tablet; Refill: 0  -     hydrOXYzine (ATARAX) 25 MG tablet; Take 1 tablet by mouth 3 (Three) Times a Day As Needed for Itching.  Dispense: 30 tablet; Refill: 0

## 2024-10-23 DIAGNOSIS — F41.9 ANXIETY: ICD-10-CM

## 2024-10-24 DIAGNOSIS — F41.9 ANXIETY: ICD-10-CM

## 2024-10-24 RX ORDER — SERTRALINE HYDROCHLORIDE 25 MG/1
25 TABLET, FILM COATED ORAL DAILY
Qty: 30 TABLET | Refills: 0 | OUTPATIENT
Start: 2024-10-24

## 2024-10-24 RX ORDER — SERTRALINE HYDROCHLORIDE 25 MG/1
25 TABLET, FILM COATED ORAL DAILY
Qty: 30 TABLET | Refills: 0 | Status: SHIPPED | OUTPATIENT
Start: 2024-10-24

## 2024-10-24 RX ORDER — HYDROXYZINE HYDROCHLORIDE 25 MG/1
25 TABLET, FILM COATED ORAL 3 TIMES DAILY PRN
Qty: 30 TABLET | Refills: 0 | Status: SHIPPED | OUTPATIENT
Start: 2024-10-24
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
## 2024-11-14 ENCOUNTER — OFFICE VISIT (OUTPATIENT)
Dept: PSYCHIATRY | Facility: CLINIC | Age: 39
End: 2024-11-14
Payer: COMMERCIAL

## 2024-11-14 VITALS
HEIGHT: 67 IN | SYSTOLIC BLOOD PRESSURE: 110 MMHG | OXYGEN SATURATION: 97 % | HEART RATE: 79 BPM | WEIGHT: 133.6 LBS | BODY MASS INDEX: 20.97 KG/M2 | DIASTOLIC BLOOD PRESSURE: 62 MMHG

## 2024-11-14 DIAGNOSIS — F41.0 GENERALIZED ANXIETY DISORDER WITH PANIC ATTACKS: Primary | Chronic | ICD-10-CM

## 2024-11-14 DIAGNOSIS — Z79.899 ENCOUNTER FOR LONG-TERM (CURRENT) USE OF MEDICATIONS: ICD-10-CM

## 2024-11-14 DIAGNOSIS — F41.1 GENERALIZED ANXIETY DISORDER WITH PANIC ATTACKS: Primary | Chronic | ICD-10-CM

## 2024-11-14 DIAGNOSIS — F90.0 ADHD, PREDOMINANTLY INATTENTIVE TYPE: Chronic | ICD-10-CM

## 2024-11-14 RX ORDER — LISDEXAMFETAMINE DIMESYLATE 30 MG/1
30 CAPSULE ORAL EVERY MORNING
Qty: 30 CAPSULE | Refills: 0 | Status: SHIPPED | OUTPATIENT
Start: 2024-11-14

## 2024-11-14 RX ORDER — SERTRALINE HYDROCHLORIDE 25 MG/1
25 TABLET, FILM COATED ORAL DAILY
Qty: 30 TABLET | Refills: 2 | Status: SHIPPED | OUTPATIENT
Start: 2024-11-14

## 2024-11-14 RX ORDER — HYDROXYZINE HYDROCHLORIDE 25 MG/1
25 TABLET, FILM COATED ORAL 3 TIMES DAILY PRN
Qty: 90 TABLET | Refills: 2 | Status: SHIPPED | OUTPATIENT
Start: 2024-11-14

## 2024-11-19 LAB
1OH-MIDAZOLAM UR QL SCN: NOT DETECTED NG/MG CREAT
6MAM UR QL SCN: NEGATIVE NG/ML
7AMINOCLONAZEPAM/CREAT UR: NOT DETECTED NG/MG CREAT
8OH-AMOXAPINE UR QL: NOT DETECTED
8OH-LOXAPINE UR QL SCN: NOT DETECTED
A-OH ALPRAZ/CREAT UR: NOT DETECTED NG/MG CREAT
A-OH-TRIAZOLAM/CREAT UR CFM: NOT DETECTED NG/MG CREAT
ALPRAZ/CREAT UR CFM: NOT DETECTED NG/MG CREAT
AMITRIP UR-MCNC: NOT DETECTED NG/ML
AMOXAPINE UR QL: NOT DETECTED
AMPHETAMINES UR QL SCN>500 NG/ML: NEGATIVE NG/ML
ANTICONVULSANTS UR: NEGATIVE
ANTIPSYCHOTICS UR: NEGATIVE
ARIPIPRAZOLE UR QL SCN: NOT DETECTED
ASENAPINE UR QL CFM: NOT DETECTED
BARBITURATES UR QL SCN: NEGATIVE NG/ML
BENZODIAZ SCN METH UR: NEGATIVE
BUPRENORPHINE UR QL SCN: NEGATIVE NG/ML
BUPROPION UR QL: NOT DETECTED
CANNABINOIDS UR QL SCN: NEGATIVE NG/ML
CARISOPRODOL UR QL: NEGATIVE NG/ML
CHLORPROMAZINE UR QL: NOT DETECTED
CITALOPRAM, UR: NOT DETECTED
CLOMIPRAMINE UR-MCNC: NOT DETECTED NG/ML
CLONAZEPAM/CREAT UR CFM: NOT DETECTED NG/MG CREAT
CLOZAPINE UR QL: NOT DETECTED
COCAINE+BZE UR QL SCN: NEGATIVE NG/ML
CREAT UR-MCNC: 155 MG/DL
DESALKYLFLURAZ/CREAT UR: NOT DETECTED NG/MG CREAT
DESIPRAMINE UR-MCNC: NOT DETECTED NG/ML
DIAZEPAM/CREAT UR: NOT DETECTED NG/MG CREAT
DOXEPIN UR-MCNC: NOT DETECTED NG/ML
DULOXETINE UR QL: NOT DETECTED
ETHANOL UR QL SCN: NEGATIVE NG/ML
FENTANYL UR QL SCN: NEGATIVE NG/ML
FLUNITRAZEPAM UR QL SCN: NOT DETECTED NG/MG CREAT
FLUOXETINE UR QL SCN: NOT DETECTED
FLUPHENAZINE UR-MCNC: NOT DETECTED NG/ML
FLUVOXAMINE UR QL: NOT DETECTED
GABAPENTIN UR-MCNC: NEGATIVE UG/ML
HALOPERIDOL UR QL: NOT DETECTED
ILOPERIDONE UR QL CFM: NOT DETECTED
IMIPRAMINE UR-MCNC: NOT DETECTED NG/ML
KRATOM IA, UR: NEGATIVE NG/ML
LORAZEPAM/CREAT UR: NOT DETECTED NG/MG CREAT
LOXAPINE UR QL: NOT DETECTED
LURASIDONE UR QL CFM: NOT DETECTED
MAPROTILINE UR QL: NOT DETECTED
ME-PHENIDATE UR QL SCN: NEGATIVE NG/ML
MESORIDAZINE UR QL: NOT DETECTED
METHADONE UR QL SCN: NEGATIVE NG/ML
METHADONE+METAB UR QL SCN: NEGATIVE NG/ML
MIDAZOLAM/CREAT UR CFM: NOT DETECTED NG/MG CREAT
MIRTAZAPINE UR-MCNC: NOT DETECTED UG/ML
MOLINDONE UR QL SCN: NOT DETECTED
NEFAZODONE UR QL: NOT DETECTED
NORCITALOPRAM UR QL: NOT DETECTED
NORCLOMIPRAMINE UR QL: NOT DETECTED
NORCLOZAPINE UR QL: NOT DETECTED
NORDIAZEPAM/CREAT UR: NOT DETECTED NG/MG CREAT
NORDOXEPIN UR QL: NOT DETECTED
NORFLUNITRAZEPAM UR-MCNC: NOT DETECTED NG/MG CREAT
NORFLUOXETINE UR-MCNC: NOT DETECTED NG/ML
NORSERTRALINE UR QL: PRESENT
NORTRIP UR-MCNC: NOT DETECTED NG/ML
ODV UR-MCNC: NOT DETECTED NG/ML
OH-BUPROPION UR-MCNC: NOT DETECTED NG/ML
OLANZAPINE UR CFM-MCNC: NOT DETECTED NG/ML
OPIATES UR SCN-MCNC: NEGATIVE NG/ML
OXAZEPAM/CREAT UR: NOT DETECTED NG/MG CREAT
OXYCODONE CTO UR SCN-MCNC: NEGATIVE NG/ML
PAROXETINE UR-MCNC: NOT DETECTED NG/L
PCP UR QL SCN: NEGATIVE NG/ML
PERPHENAZINE UR QL: NOT DETECTED
PIMOZIDE, UR: NOT DETECTED
PREGABALIN UR QL SCN: NOT DETECTED
PRESCRIBED MEDICATIONS: NORMAL
PROCHLORPERAZINE UR QL: NOT DETECTED
PROPOXYPH UR QL SCN: NEGATIVE NG/ML
PROTRIP UR QL: NOT DETECTED
QUETIAPINE CTO UR CFM-MCNC: NOT DETECTED NG/ML
RISPERIDONE UR QL: NOT DETECTED
SERTRALINE UR-MCNC: PRESENT NG/ML
TAPENTADOL CTO UR SCN-MCNC: NEGATIVE NG/ML
TEMAZEPAM/CREAT UR: NOT DETECTED NG/MG CREAT
THIORIDAZINE UR-MCNC: NOT DETECTED UG/ML
THIOTHIXENE UR QL: NOT DETECTED
TRAMADOL UR QL SCN: NEGATIVE NG/ML
TRAZODONE UR QL: NOT DETECTED
TRAZODONE UR-MCNC: NOT DETECTED UG/ML
TRICYCLICS TESTED UR SCN: NORMAL
TRIFPERAZINE UR QL: NOT DETECTED
TRIMIPRAMINE UR QL: NOT DETECTED
VENLAFAXINE UR QL: NOT DETECTED
VILAZ UR QL SCN: NOT DETECTED
ZIPRASIDONE UR QL SCN: NOT DETECTED

## 2024-12-02 DIAGNOSIS — F41.1 GENERALIZED ANXIETY DISORDER WITH PANIC ATTACKS: Chronic | ICD-10-CM

## 2024-12-02 DIAGNOSIS — F41.0 GENERALIZED ANXIETY DISORDER WITH PANIC ATTACKS: Chronic | ICD-10-CM

## 2024-12-02 NOTE — TELEPHONE ENCOUNTER
Rx Refill Note  Requested Prescriptions     Pending Prescriptions Disp Refills    sertraline (Zoloft) 25 MG tablet 30 tablet 2     Sig: Take 1 tablet by mouth Daily.        Last office visit with prescribing clinician: 11/14/2024     Next office visit with prescribing clinician: 1/13/2025     Office Visit with Carol Pena APRN (11/14/2024)       Maribel Landin  12/02/24, 09:45 EST

## 2024-12-17 DIAGNOSIS — F90.0 ADHD, PREDOMINANTLY INATTENTIVE TYPE: Chronic | ICD-10-CM

## 2024-12-17 DIAGNOSIS — F41.1 GENERALIZED ANXIETY DISORDER WITH PANIC ATTACKS: Chronic | ICD-10-CM

## 2024-12-17 DIAGNOSIS — F41.0 GENERALIZED ANXIETY DISORDER WITH PANIC ATTACKS: Chronic | ICD-10-CM

## 2024-12-17 NOTE — TELEPHONE ENCOUNTER
Rx Refill Note  Requested Prescriptions     Pending Prescriptions Disp Refills    hydrOXYzine (ATARAX) 25 MG tablet 90 tablet 2     Sig: Take 1 tablet by mouth 3 (Three) Times a Day As Needed for Anxiety.    lisdexamfetamine (Vyvanse) 30 MG capsule 30 capsule 0     Sig: Take 1 capsule by mouth Every Morning        Last office visit with prescribing clinician: 11/14/2024     Next office visit with prescribing clinician: 1/13/2025     Office Visit with Carol Pena APRN (11/14/2024)     ToxAssure Flex 22, Ur w/DL - Urine, Clean Catch (11/14/2024 10:14)     BEHAVIORAL HEALTH - SCAN - Inspect report, Guy, 12-17-24 (12/17/2024)     Inspect Fill 11/14/24    Maribel Landin  12/17/24, 16:11 EST

## 2024-12-18 RX ORDER — LISDEXAMFETAMINE DIMESYLATE 40 MG/1
40 CAPSULE ORAL EVERY MORNING
Qty: 30 CAPSULE | Refills: 0 | Status: SHIPPED | OUTPATIENT
Start: 2024-12-18

## 2024-12-18 RX ORDER — HYDROXYZINE HYDROCHLORIDE 25 MG/1
25 TABLET, FILM COATED ORAL 3 TIMES DAILY PRN
Qty: 90 TABLET | Refills: 2 | Status: SHIPPED | OUTPATIENT
Start: 2024-12-18

## 2025-01-21 DIAGNOSIS — F90.0 ADHD, PREDOMINANTLY INATTENTIVE TYPE: Chronic | ICD-10-CM

## 2025-01-21 RX ORDER — LISDEXAMFETAMINE DIMESYLATE 40 MG/1
40 CAPSULE ORAL EVERY MORNING
Qty: 30 CAPSULE | Refills: 0 | Status: SHIPPED | OUTPATIENT
Start: 2025-01-21

## 2025-01-21 NOTE — TELEPHONE ENCOUNTER
Rx Refill Note  Requested Prescriptions     Pending Prescriptions Disp Refills    lisdexamfetamine (Vyvanse) 40 MG capsule 30 capsule 0     Sig: Take 1 capsule by mouth Every Morning        Last office visit with prescribing clinician: 11/14/2024     Next office visit with prescribing clinician: 4/1/2025     Office Visit with Carol Pena APRN (11/14/2024)     ToxAssure Flex 22, Ur w/DL - Urine, Clean Catch (11/14/2024 10:14)     BEHAVIORAL HEALTH - SCAN - Inspect report, Guy, 1/21/25 (01/21/2025)     Inspect Fill 12/18/25    Maribel Landin  01/21/25, 10:54 EST

## 2025-01-23 DIAGNOSIS — F41.1 GENERALIZED ANXIETY DISORDER WITH PANIC ATTACKS: Chronic | ICD-10-CM

## 2025-01-23 DIAGNOSIS — F41.0 GENERALIZED ANXIETY DISORDER WITH PANIC ATTACKS: Chronic | ICD-10-CM

## 2025-02-25 DIAGNOSIS — F90.0 ADHD, PREDOMINANTLY INATTENTIVE TYPE: Chronic | ICD-10-CM

## 2025-02-26 NOTE — TELEPHONE ENCOUNTER
Rx Refill Note  Requested Prescriptions     Pending Prescriptions Disp Refills    lisdexamfetamine (Vyvanse) 40 MG capsule 30 capsule 0     Sig: Take 1 capsule by mouth Every Morning        Last office visit with prescribing clinician: 11/14/2024     Next office visit with prescribing clinician: 4/1/2025     Office Visit with Carol Pena APRN (11/14/2024)     ToxAssure Flex 22, Ur w/DL - Urine, Clean Catch (11/14/2024 10:14)     BEHAVIORAL HEALTH - SCAN - Inspect report, Guy, 2/26/25 (02/26/2025)     Inspect Fill 1/24/25    Maribel Landin  02/26/25, 15:46 EST

## 2025-02-27 RX ORDER — LISDEXAMFETAMINE DIMESYLATE 40 MG/1
40 CAPSULE ORAL EVERY MORNING
Qty: 30 CAPSULE | Refills: 0 | Status: SHIPPED | OUTPATIENT
Start: 2025-02-27

## 2025-03-25 DIAGNOSIS — F41.0 GENERALIZED ANXIETY DISORDER WITH PANIC ATTACKS: Chronic | ICD-10-CM

## 2025-03-25 DIAGNOSIS — F41.1 GENERALIZED ANXIETY DISORDER WITH PANIC ATTACKS: Chronic | ICD-10-CM

## 2025-03-25 RX ORDER — HYDROXYZINE HYDROCHLORIDE 25 MG/1
25 TABLET, FILM COATED ORAL 3 TIMES DAILY PRN
Qty: 90 TABLET | Refills: 0 | Status: SHIPPED | OUTPATIENT
Start: 2025-03-25

## 2025-03-25 NOTE — TELEPHONE ENCOUNTER
Rx Refill Note  Requested Prescriptions     Pending Prescriptions Disp Refills    hydrOXYzine (ATARAX) 25 MG tablet [Pharmacy Med Name: hydrOXYzine HCl 25 MG Oral Tablet] 90 tablet 0     Sig: TAKE 1 TABLET BY MOUTH THREE TIMES DAILY AS NEEDED FOR ANXIETY        Last office visit with prescribing clinician: 11/14/2024     Next office visit with prescribing clinician: 4/1/2025     Office Visit with Carol Pena APRN (11/14/2024)     Maribel Landin  03/25/25, 08:16 EDT

## 2025-04-01 ENCOUNTER — OFFICE VISIT (OUTPATIENT)
Dept: PSYCHIATRY | Facility: CLINIC | Age: 40
End: 2025-04-01
Payer: COMMERCIAL

## 2025-04-01 DIAGNOSIS — F90.0 ADHD, PREDOMINANTLY INATTENTIVE TYPE: Primary | Chronic | ICD-10-CM

## 2025-04-01 DIAGNOSIS — F41.1 GENERALIZED ANXIETY DISORDER WITH PANIC ATTACKS: Chronic | ICD-10-CM

## 2025-04-01 DIAGNOSIS — F41.0 GENERALIZED ANXIETY DISORDER WITH PANIC ATTACKS: Chronic | ICD-10-CM

## 2025-04-01 RX ORDER — LISDEXAMFETAMINE DIMESYLATE 40 MG/1
40 CAPSULE ORAL EVERY MORNING
Qty: 30 CAPSULE | Refills: 0 | Status: SHIPPED | OUTPATIENT
Start: 2025-04-01
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
## 2025-04-04 NOTE — PROGRESS NOTES
I have reviewed the notes, assessments, and/or procedures performed by Carol Pena, I concur with her/his documentation of Jeannine Delcid.

## 2025-04-19 DIAGNOSIS — F41.1 GENERALIZED ANXIETY DISORDER WITH PANIC ATTACKS: Chronic | ICD-10-CM

## 2025-04-19 DIAGNOSIS — F41.0 GENERALIZED ANXIETY DISORDER WITH PANIC ATTACKS: Chronic | ICD-10-CM

## 2025-05-05 DIAGNOSIS — F90.0 ADHD, PREDOMINANTLY INATTENTIVE TYPE: Chronic | ICD-10-CM

## 2025-05-05 RX ORDER — LISDEXAMFETAMINE DIMESYLATE 40 MG/1
40 CAPSULE ORAL EVERY MORNING
Qty: 30 CAPSULE | Refills: 0 | Status: SHIPPED | OUTPATIENT
Start: 2025-05-05

## 2025-05-05 RX ORDER — LISDEXAMFETAMINE DIMESYLATE 40 MG/1
40 CAPSULE ORAL EVERY MORNING
Qty: 30 CAPSULE | Refills: 0 | Status: SHIPPED | OUTPATIENT
Start: 2025-06-03

## 2025-05-05 NOTE — TELEPHONE ENCOUNTER
Rx Refill Note  Requested Prescriptions     Pending Prescriptions Disp Refills    lisdexamfetamine (Vyvanse) 40 MG capsule 30 capsule 0     Sig: Take 1 capsule by mouth Every Morning      Last office visit with prescribing clinician: 4/1/2025     Next office visit with prescribing clinician: 7/2/2025     Office Visit with Carol Pena APRN (04/01/2025)     BEHAVIORAL HEALTH - SCAN - hermilo Elizabeth, 1985, an (05/05/2025)     ToxAssure Flex 22, Ur w/DL - Urine, Clean Catch (11/14/2024 10:14)     Med check - 7-    Last Inspect fill:     Heather More MA  05/05/25, 09:48 EDT

## 2025-06-30 DIAGNOSIS — F41.0 GENERALIZED ANXIETY DISORDER WITH PANIC ATTACKS: Chronic | ICD-10-CM

## 2025-06-30 DIAGNOSIS — F41.1 GENERALIZED ANXIETY DISORDER WITH PANIC ATTACKS: Chronic | ICD-10-CM

## 2025-06-30 NOTE — TELEPHONE ENCOUNTER
Rx Refill Note  Requested Prescriptions     Pending Prescriptions Disp Refills    hydrOXYzine (ATARAX) 25 MG tablet [Pharmacy Med Name: hydrOXYzine HCl 25 MG Oral Tablet] 90 tablet 0     Sig: TAKE 1 TABLET BY MOUTH THREE TIMES DAILY AS NEEDED FOR ANXIETY        Last office visit with prescribing clinician: 4/1/2025     Next office visit with prescribing clinician: Visit date not found     Office Visit with Carol Pena APRN (04/01/2025)     Maribel Landin  06/30/25, 08:05 EDT

## 2025-07-01 RX ORDER — HYDROXYZINE HYDROCHLORIDE 25 MG/1
25 TABLET, FILM COATED ORAL 3 TIMES DAILY PRN
Qty: 90 TABLET | Refills: 0 | Status: SHIPPED | OUTPATIENT
Start: 2025-07-01

## 2025-07-20 DIAGNOSIS — F41.1 GENERALIZED ANXIETY DISORDER WITH PANIC ATTACKS: Chronic | ICD-10-CM

## 2025-07-20 DIAGNOSIS — F41.0 GENERALIZED ANXIETY DISORDER WITH PANIC ATTACKS: Chronic | ICD-10-CM

## 2025-08-02 DIAGNOSIS — F41.1 GENERALIZED ANXIETY DISORDER WITH PANIC ATTACKS: Chronic | ICD-10-CM

## 2025-08-02 DIAGNOSIS — F41.0 GENERALIZED ANXIETY DISORDER WITH PANIC ATTACKS: Chronic | ICD-10-CM

## 2025-08-02 RX ORDER — HYDROXYZINE HYDROCHLORIDE 25 MG/1
25 TABLET, FILM COATED ORAL 3 TIMES DAILY PRN
Qty: 90 TABLET | Refills: 0 | Status: SHIPPED | OUTPATIENT
Start: 2025-08-02

## 2025-08-22 DIAGNOSIS — F41.1 GENERALIZED ANXIETY DISORDER WITH PANIC ATTACKS: Chronic | ICD-10-CM

## 2025-08-22 DIAGNOSIS — F41.0 GENERALIZED ANXIETY DISORDER WITH PANIC ATTACKS: Chronic | ICD-10-CM

## 2025-08-28 DIAGNOSIS — F41.1 GENERALIZED ANXIETY DISORDER WITH PANIC ATTACKS: Chronic | ICD-10-CM

## 2025-08-28 DIAGNOSIS — F41.0 GENERALIZED ANXIETY DISORDER WITH PANIC ATTACKS: Chronic | ICD-10-CM

## 2025-08-28 RX ORDER — HYDROXYZINE HYDROCHLORIDE 25 MG/1
25 TABLET, FILM COATED ORAL 3 TIMES DAILY PRN
Qty: 90 TABLET | Refills: 0 | Status: SHIPPED | OUTPATIENT
Start: 2025-08-28